# Patient Record
Sex: FEMALE | Race: WHITE | Employment: OTHER | URBAN - METROPOLITAN AREA
[De-identification: names, ages, dates, MRNs, and addresses within clinical notes are randomized per-mention and may not be internally consistent; named-entity substitution may affect disease eponyms.]

---

## 2018-08-03 ENCOUNTER — HOSPITAL ENCOUNTER (OUTPATIENT)
Age: 23
Discharge: HOME | End: 2018-08-05
Payer: COMMERCIAL

## 2018-08-03 ENCOUNTER — OFFICE VISIT (OUTPATIENT)
Dept: FAMILY MEDICINE CLINIC | Age: 23
End: 2018-08-03
Payer: COMMERCIAL

## 2018-08-03 VITALS
DIASTOLIC BLOOD PRESSURE: 70 MMHG | WEIGHT: 197 LBS | BODY MASS INDEX: 33.63 KG/M2 | OXYGEN SATURATION: 98 % | SYSTOLIC BLOOD PRESSURE: 110 MMHG | HEART RATE: 71 BPM | HEIGHT: 64 IN

## 2018-08-03 DIAGNOSIS — R53.83 FATIGUE, UNSPECIFIED TYPE: ICD-10-CM

## 2018-08-03 DIAGNOSIS — R53.83 FATIGUE, UNSPECIFIED TYPE: Primary | ICD-10-CM

## 2018-08-03 PROCEDURE — 1036F TOBACCO NON-USER: CPT | Performed by: FAMILY MEDICINE

## 2018-08-03 PROCEDURE — 80053 COMPREHEN METABOLIC PANEL: CPT

## 2018-08-03 PROCEDURE — 82306 VITAMIN D 25 HYDROXY: CPT

## 2018-08-03 PROCEDURE — G8427 DOCREV CUR MEDS BY ELIG CLIN: HCPCS | Performed by: FAMILY MEDICINE

## 2018-08-03 PROCEDURE — 84443 ASSAY THYROID STIM HORMONE: CPT

## 2018-08-03 PROCEDURE — G8417 CALC BMI ABV UP PARAM F/U: HCPCS | Performed by: FAMILY MEDICINE

## 2018-08-03 PROCEDURE — 82607 VITAMIN B-12: CPT

## 2018-08-03 PROCEDURE — 36415 COLL VENOUS BLD VENIPUNCTURE: CPT

## 2018-08-03 PROCEDURE — 83036 HEMOGLOBIN GLYCOSYLATED A1C: CPT

## 2018-08-03 PROCEDURE — 99202 OFFICE O/P NEW SF 15 MIN: CPT | Performed by: FAMILY MEDICINE

## 2018-08-03 PROCEDURE — 82746 ASSAY OF FOLIC ACID SERUM: CPT

## 2018-08-03 PROCEDURE — 83550 IRON BINDING TEST: CPT

## 2018-08-03 PROCEDURE — 83540 ASSAY OF IRON: CPT

## 2018-08-03 PROCEDURE — 84439 ASSAY OF FREE THYROXINE: CPT

## 2018-08-03 PROCEDURE — 85025 COMPLETE CBC W/AUTO DIFF WBC: CPT

## 2018-08-03 ASSESSMENT — PATIENT HEALTH QUESTIONNAIRE - PHQ9
SUM OF ALL RESPONSES TO PHQ QUESTIONS 1-9: 0
2. FEELING DOWN, DEPRESSED OR HOPELESS: 0
SUM OF ALL RESPONSES TO PHQ QUESTIONS 1-9: 0
1. LITTLE INTEREST OR PLEASURE IN DOING THINGS: 0
2. FEELING DOWN, DEPRESSED OR HOPELESS: 0
SUM OF ALL RESPONSES TO PHQ9 QUESTIONS 1 & 2: 0
1. LITTLE INTEREST OR PLEASURE IN DOING THINGS: 0
SUM OF ALL RESPONSES TO PHQ9 QUESTIONS 1 & 2: 0

## 2018-08-04 LAB
ALBUMIN SERPL-MCNC: 4.8 G/DL (ref 3.5–5.2)
ALP BLD-CCNC: 108 U/L (ref 35–104)
ALT SERPL-CCNC: 12 U/L (ref 0–32)
ANION GAP SERPL CALCULATED.3IONS-SCNC: 16 MMOL/L (ref 7–16)
AST SERPL-CCNC: 16 U/L (ref 0–31)
BASOPHILS ABSOLUTE: 0.03 E9/L (ref 0–0.2)
BASOPHILS RELATIVE PERCENT: 0.6 % (ref 0–2)
BILIRUB SERPL-MCNC: 0.4 MG/DL (ref 0–1.2)
BUN BLDV-MCNC: 13 MG/DL (ref 6–20)
CALCIUM SERPL-MCNC: 9.6 MG/DL (ref 8.6–10.2)
CHLORIDE BLD-SCNC: 101 MMOL/L (ref 98–107)
CO2: 25 MMOL/L (ref 22–29)
CREAT SERPL-MCNC: 0.8 MG/DL (ref 0.5–1)
EOSINOPHILS ABSOLUTE: 0.26 E9/L (ref 0.05–0.5)
EOSINOPHILS RELATIVE PERCENT: 5 % (ref 0–6)
FOLATE: 11.3 NG/ML (ref 4.8–24.2)
GFR AFRICAN AMERICAN: >60
GFR NON-AFRICAN AMERICAN: >60 ML/MIN/1.73
GLUCOSE BLD-MCNC: 101 MG/DL (ref 74–109)
HBA1C MFR BLD: 5.2 % (ref 4–5.6)
HCT VFR BLD CALC: 44.1 % (ref 34–48)
HEMOGLOBIN: 13.9 G/DL (ref 11.5–15.5)
IMMATURE GRANULOCYTES #: 0.01 E9/L
IMMATURE GRANULOCYTES %: 0.2 % (ref 0–5)
IRON SATURATION: 22 % (ref 15–50)
IRON: 92 MCG/DL (ref 37–145)
LYMPHOCYTES ABSOLUTE: 1.3 E9/L (ref 1.5–4)
LYMPHOCYTES RELATIVE PERCENT: 25.2 % (ref 20–42)
MCH RBC QN AUTO: 28.3 PG (ref 26–35)
MCHC RBC AUTO-ENTMCNC: 31.5 % (ref 32–34.5)
MCV RBC AUTO: 89.8 FL (ref 80–99.9)
MONOCYTES ABSOLUTE: 0.38 E9/L (ref 0.1–0.95)
MONOCYTES RELATIVE PERCENT: 7.4 % (ref 2–12)
NEUTROPHILS ABSOLUTE: 3.18 E9/L (ref 1.8–7.3)
NEUTROPHILS RELATIVE PERCENT: 61.6 % (ref 43–80)
PDW BLD-RTO: 13.5 FL (ref 11.5–15)
PLATELET # BLD: 178 E9/L (ref 130–450)
PMV BLD AUTO: 11.6 FL (ref 7–12)
POTASSIUM SERPL-SCNC: 3.9 MMOL/L (ref 3.5–5)
RBC # BLD: 4.91 E12/L (ref 3.5–5.5)
SODIUM BLD-SCNC: 142 MMOL/L (ref 132–146)
T4 FREE: 1.17 NG/DL (ref 0.93–1.7)
TOTAL IRON BINDING CAPACITY: 414 MCG/DL (ref 250–450)
TOTAL PROTEIN: 7.9 G/DL (ref 6.4–8.3)
TSH SERPL DL<=0.05 MIU/L-ACNC: 2.61 UIU/ML (ref 0.27–4.2)
VITAMIN B-12: 448 PG/ML (ref 211–946)
VITAMIN D 25-HYDROXY: 29 NG/ML (ref 30–100)
WBC # BLD: 5.2 E9/L (ref 4.5–11.5)

## 2018-08-05 ASSESSMENT — ENCOUNTER SYMPTOMS
COUGH: 0
PHOTOPHOBIA: 0
CONSTIPATION: 0
DIARRHEA: 0
SHORTNESS OF BREATH: 0
VOMITING: 0
WHEEZING: 0
RECTAL PAIN: 0
NAUSEA: 0
TROUBLE SWALLOWING: 0
CHOKING: 0
EYE DISCHARGE: 0
ABDOMINAL PAIN: 0
RHINORRHEA: 0
CHEST TIGHTNESS: 0
ANAL BLEEDING: 0
BACK PAIN: 0
COLOR CHANGE: 0
VOICE CHANGE: 0
ABDOMINAL DISTENTION: 0
SORE THROAT: 0
EYE REDNESS: 0
SINUS PRESSURE: 0
STRIDOR: 0
FACIAL SWELLING: 0
BLOOD IN STOOL: 0
EYE ITCHING: 0
APNEA: 0
EYE PAIN: 0

## 2018-08-05 NOTE — PROGRESS NOTES
Subjective:      Patient ID: Ketty Morejon is a 24 y.o. female. Fatigue   This is a new problem. The current episode started more than 1 month ago. The problem occurs daily. The problem has been waxing and waning. Associated symptoms include fatigue. Pertinent negatives include no abdominal pain, arthralgias, chest pain, chills, congestion, coughing, diaphoresis, fever, headaches, joint swelling, myalgias, nausea, neck pain, numbness, rash, sore throat, vomiting or weakness. Nothing aggravates the symptoms. She has tried nothing for the symptoms. The treatment provided no relief. Review of Systems   Constitutional: Positive for fatigue. Negative for activity change, appetite change, chills, diaphoresis, fever and unexpected weight change. HENT: Negative for congestion, dental problem, drooling, ear discharge, ear pain, facial swelling, hearing loss, mouth sores, nosebleeds, postnasal drip, rhinorrhea, sinus pressure, sneezing, sore throat, tinnitus, trouble swallowing and voice change. Eyes: Negative for photophobia, pain, discharge, redness, itching and visual disturbance. Respiratory: Negative for apnea, cough, choking, chest tightness, shortness of breath, wheezing and stridor. Cardiovascular: Negative for chest pain, palpitations and leg swelling. Gastrointestinal: Negative for abdominal distention, abdominal pain, anal bleeding, blood in stool, constipation, diarrhea, nausea, rectal pain and vomiting. Endocrine: Negative for cold intolerance, heat intolerance, polydipsia, polyphagia and polyuria. Genitourinary: Negative for decreased urine volume, difficulty urinating, dyspareunia, dysuria, enuresis, flank pain, frequency, genital sores, hematuria, menstrual problem, pelvic pain, urgency, vaginal bleeding, vaginal discharge and vaginal pain. Musculoskeletal: Negative for arthralgias, back pain, gait problem, joint swelling, myalgias, neck pain and neck stiffness.    Skin:

## 2019-11-21 ENCOUNTER — INITIAL PRENATAL (OUTPATIENT)
Dept: OBGYN CLINIC | Age: 24
End: 2019-11-21
Payer: COMMERCIAL

## 2019-11-21 VITALS
BODY MASS INDEX: 37.04 KG/M2 | WEIGHT: 215.8 LBS | SYSTOLIC BLOOD PRESSURE: 112 MMHG | DIASTOLIC BLOOD PRESSURE: 64 MMHG | HEART RATE: 81 BPM

## 2019-11-21 DIAGNOSIS — Z82.79 FAMILY HISTORY OF VSD (VENTRICULAR SEPTAL DEFECT): ICD-10-CM

## 2019-11-21 DIAGNOSIS — Z36.89 ENCOUNTER FOR FETAL ANATOMIC SURVEY: ICD-10-CM

## 2019-11-21 DIAGNOSIS — Z3A.21 21 WEEKS GESTATION OF PREGNANCY: Primary | ICD-10-CM

## 2019-11-21 LAB
GLUCOSE URINE, POC: NEGATIVE
PROTEIN UA: NEGATIVE

## 2019-11-21 PROCEDURE — 99202 OFFICE O/P NEW SF 15 MIN: CPT | Performed by: OBSTETRICS & GYNECOLOGY

## 2019-11-21 PROCEDURE — 81002 URINALYSIS NONAUTO W/O SCOPE: CPT | Performed by: OBSTETRICS & GYNECOLOGY

## 2019-11-21 PROCEDURE — G8419 CALC BMI OUT NRM PARAM NOF/U: HCPCS | Performed by: OBSTETRICS & GYNECOLOGY

## 2019-11-21 PROCEDURE — 76811 OB US DETAILED SNGL FETUS: CPT | Performed by: OBSTETRICS & GYNECOLOGY

## 2019-11-21 PROCEDURE — G8484 FLU IMMUNIZE NO ADMIN: HCPCS | Performed by: OBSTETRICS & GYNECOLOGY

## 2019-11-21 PROCEDURE — G8427 DOCREV CUR MEDS BY ELIG CLIN: HCPCS | Performed by: OBSTETRICS & GYNECOLOGY

## 2019-11-21 PROCEDURE — 1036F TOBACCO NON-USER: CPT | Performed by: OBSTETRICS & GYNECOLOGY

## 2020-01-16 ENCOUNTER — ROUTINE PRENATAL (OUTPATIENT)
Dept: OBGYN CLINIC | Age: 25
End: 2020-01-16
Payer: COMMERCIAL

## 2020-01-16 VITALS
BODY MASS INDEX: 39.82 KG/M2 | WEIGHT: 232 LBS | DIASTOLIC BLOOD PRESSURE: 69 MMHG | HEART RATE: 84 BPM | SYSTOLIC BLOOD PRESSURE: 102 MMHG

## 2020-01-16 PROBLEM — Z3A.29 29 WEEKS GESTATION OF PREGNANCY: Status: ACTIVE | Noted: 2020-01-16

## 2020-01-16 LAB
GLUCOSE URINE, POC: NEGATIVE
PROTEIN UA: POSITIVE

## 2020-01-16 PROCEDURE — 81002 URINALYSIS NONAUTO W/O SCOPE: CPT | Performed by: OBSTETRICS & GYNECOLOGY

## 2020-01-16 PROCEDURE — 99201 PR OFFICE OUTPATIENT NEW 10 MINUTES: CPT | Performed by: OBSTETRICS & GYNECOLOGY

## 2020-01-16 PROCEDURE — 76816 OB US FOLLOW-UP PER FETUS: CPT | Performed by: OBSTETRICS & GYNECOLOGY

## 2020-01-16 PROCEDURE — 76819 FETAL BIOPHYS PROFIL W/O NST: CPT | Performed by: OBSTETRICS & GYNECOLOGY

## 2020-01-16 PROCEDURE — 99211 OFF/OP EST MAY X REQ PHY/QHP: CPT | Performed by: OBSTETRICS & GYNECOLOGY

## 2020-01-16 NOTE — LETTER
[facility]  [id number] [address] [address]  Phone: [phone]  Fax: [phone]    Anahy Tavera MD        2020       Patient: Johnna Arambula   MR Number: [id number]   YOB: 1995   Date of Visit: 2020       Dear Dr. Hodge Erp: Thank you for the request for consultation for Althea Layman. Below is my progress note for today's consultation. MATERNAL-FETAL MEDICINE CONSULT    Referring/Requesting Provider: Berny Posadas      CHIEF COMPLAINT: Premature atrial contractions    HISTORY OF PRESENT ILLNESS:   Geronimo Quiroz is a 21 y.o. female  at 32w2d who presents for ultrasound and consultation  regarding premature atrial contractions that were identified on her growth scan today. She denies obstetrical complaints and is overall feeling well. She denies smoking, caffeine use. She does use cocoa/shea butter on a regular basis.           OB HISTORY:  OB History    Para Term  AB Living   2 1 1     1   SAB TAB Ectopic Molar Multiple Live Births             1      # Outcome Date GA Lbr Danie/2nd Weight Sex Delivery Anes PTL Lv   2 Current            1 Term 17 40w0d  7 lb 1 oz (3.204 kg) M Vag-Spont  N TOMA       PAST MEDICAL HISTORY:  Past Medical History:   Diagnosis Date    Complication of anesthesia     hx of waking during anesthesia       PAST SURGICAL HISTORY:  Past Surgical History:   Procedure Laterality Date    SALIVARY GLAND SURGERY  2011    left submandibular abscess       PERTINENT FAMILY HISTORY:  Family History   Problem Relation Age of Onset    Thyroid Disease Mother     No Known Problems Father     Stroke Maternal Grandfather     Diabetes Maternal Grandfather        MEDS:   Current Outpatient Rx   Medication Sig Dispense Refill    Prenatal MV-Min-Fe Fum-FA-DHA (PRENATAL 1 PO) Take by mouth         ALLERGY:   No Known Allergies    REVIEW OF SYSTEMS:

## 2020-01-16 NOTE — PATIENT INSTRUCTIONS
the rib cage and above the waist on either side of the back. ? Blood in your urine. Watch closely for changes in your health, and be sure to contact your doctor if:  · You have vaginal discharge that smells bad. · You have skin changes, such as:  ? A rash. ? Itching. ? Yellow color to your skin. · You have other concerns about your pregnancy. If you have labor signs at 37 weeks or more  If you have signs of labor at 37 weeks or more, your doctor may tell you to call when your labor becomes more active. Symptoms of active labor include:  · Contractions that are regular. · Contractions that are less than 5 minutes apart. · Contractions that are hard to talk through. Follow-up care is a key part of your treatment and safety. Be sure to make and go to all appointments, and call your doctor if you are having problems. It's also a good idea to know your test results and keep a list of the medicines you take. Where can you learn more? Go to https://Weroompechaceeb.SparCode. org and sign in to your S[employer] My Timeshare NOW account. Enter  in the Notizza box to learn more about \"Learning About When to Call Your Doctor During Pregnancy (After 20 Weeks). \"     If you do not have an account, please click on the \"Sign Up Now\" link. Current as of: May 29, 2019  Content Version: 12.3  © 0947-8872 Healthwise, Incorporated. Care instructions adapted under license by [facility] ([facility]). If you have questions about a medical condition or this instruction, always ask your healthcare professional. [place] [address] any warranty or liability for your use of this information. Patient Education        Counting Your Baby's Kicks: Care Instructions  Your Care Instructions    Counting your baby's kicks is one way your doctor can tell that your baby is healthy. Most women--especially in a first pregnancy--feel their baby move for the first time between 16 and 22 weeks.  The movement may feel like flutters rather than kicks. Your baby may move more at certain times of the day. When you are active, you may notice less kicking than when you are resting. At your prenatal visits, your doctor will ask whether the baby is active. In your last trimester, your doctor may ask you to count the number of times you feel your baby move. Follow-up care is a key part of your treatment and safety. Be sure to make and go to all appointments, and call your doctor if you are having problems. It's also a good idea to know your test results and keep a list of the medicines you take. How do you count fetal kicks? · A common method of checking your baby's movement is to count the number of kicks or moves you feel in 1 hour. Ten movements (such as kicks, flutters, or rolls) in 1 hour are normal. Some doctors suggest that you count in the morning until you get to 10 movements. Then you can quit for that day and start again the next day. · Pick your baby's most active time of day to count. This may be any time from morning to evening. · If you do not feel 10 movements in an hour, your baby may be sleeping. Wait for the next hour and count again. When should you call for help? Call your doctor now or seek immediate medical care if:    · You noticed that your baby has stopped moving or is moving much less than normal.    Watch closely for changes in your health, and be sure to contact your doctor if you have any problems. Where can you learn more? Go to https://Vir2uscherylSilith.IO.Consensus Point. org and sign in to your Rattle account. Enter S240 in the Pint Please box to learn more about \"Counting Your Baby's Kicks: Care Instructions. \"     If you do not have an account, please click on the \"Sign Up Now\" link. Current as of: May 29, 2019  Content Version: 12.3  © 1190-3117 Healthwise, Incorporated. Care instructions adapted under license by [facility] ([facility]).  If you have questions about a medical condition or this instruction, always ask your healthcare professional. [place] [address] any warranty or liability for your use of this information.

## 2020-01-16 NOTE — PROGRESS NOTES
DOS: 20     [place] MATERNAL FETAL MEDICINE      MATERNAL-FETAL MEDICINE CONSULT    Referring/Requesting Provider: Aristides Simons      CHIEF COMPLAINT: Premature atrial contractions    HISTORY OF PRESENT ILLNESS:   Geronimo Quiroz is a 21 y.o. female  at 32w2d who presents for ultrasound and consultation  regarding premature atrial contractions that were identified on her growth scan today. She denies obstetrical complaints and is overall feeling well. She denies smoking, caffeine use. She does use cocoa/shea butter on a regular basis. OB HISTORY:  OB History    Para Term  AB Living   2 1 1     1   SAB TAB Ectopic Molar Multiple Live Births             1      # Outcome Date GA Lbr Danie/2nd Weight Sex Delivery Anes PTL Lv   2 Current            1 Term 17 40w0d  7 lb 1 oz (3.204 kg) M Vag-Spont  N TOMA       PAST MEDICAL HISTORY:  Past Medical History:   Diagnosis Date    Complication of anesthesia     hx of waking during anesthesia       PAST SURGICAL HISTORY:  Past Surgical History:   Procedure Laterality Date    SALIVARY GLAND SURGERY  2011    left submandibular abscess       PERTINENT FAMILY HISTORY:  Family History   Problem Relation Age of Onset    Thyroid Disease Mother     No Known Problems Father     Stroke Maternal Grandfather     Diabetes Maternal Grandfather        MEDS:   Current Outpatient Rx   Medication Sig Dispense Refill    Prenatal MV-Min-Fe Fum-FA-DHA (PRENATAL 1 PO) Take by mouth         ALLERGY:   No Known Allergies    REVIEW OF SYSTEMS:     A comprehensive review of systems was negative. PHYSICAL EXAM:  VITAL SIGNS: /69   Pulse 84   Wt 232 lb (105.2 kg)   BMI 39.82 kg/m²   General Appearance: Alert, appropriate appearance for age. No acute distress    IMAGING:  Please see ultrasound report for full details.      LABS:    Initial Prenatal on 2019   Component Date Value Ref Range Status    Protein, UA 2019 Negative Negative Final    Glucose, UA POC 2019 negative   Final         IMPRESSION:   Arelis Cortez is a 21 y.o. female  with premature atrial contractions found on ultrasound today. We focused on the ultrasound findings today and did not discuss other pregnancy related issues. She was counseled that premature atrial contractions are typically benign but in rare cases can progress to a sustained tachyarrhythmia. RECOMMENDATIONS:  Fetal echocardiogram with Pediatric Cardiology has been scheduled. Consider referral to the [address] [place] [address] for  coordination of care. Follow up q4 weeks to evaluate biometric parameters and evaluation of fetal heart rhythm. Please listen to fetal heart rate for approximately 1 minute at each prenatal visit. Sustained tachycardia or bradycardia should be re-referred to [facility]. The total patient time of the visit was 15 minutes, of which was greater than 50% of the time was spent counseling and coordinating care.

## 2020-01-21 ENCOUNTER — TELEPHONE (OUTPATIENT)
Dept: OBGYN CLINIC | Age: 25
End: 2020-01-21

## 2020-02-16 ENCOUNTER — HOSPITAL ENCOUNTER (EMERGENCY)
Age: 25
Discharge: HOME | End: 2020-02-16
Attending: EMERGENCY MEDICINE
Payer: COMMERCIAL

## 2020-02-16 VITALS
SYSTOLIC BLOOD PRESSURE: 135 MMHG | BODY MASS INDEX: 39.49 KG/M2 | OXYGEN SATURATION: 98 % | TEMPERATURE: 97.6 F | HEIGHT: 65 IN | WEIGHT: 237 LBS | DIASTOLIC BLOOD PRESSURE: 76 MMHG | RESPIRATION RATE: 20 BRPM | HEART RATE: 101 BPM

## 2020-02-16 LAB
BACTERIA: ABNORMAL /HPF
BILIRUBIN URINE: NEGATIVE
BLOOD, URINE: NEGATIVE
CLARITY: CLEAR
COLOR: YELLOW
EPITHELIAL CELLS, UA: ABNORMAL /HPF
GLUCOSE URINE: NEGATIVE MG/DL
KETONES, URINE: NEGATIVE MG/DL
LEUKOCYTE ESTERASE, URINE: ABNORMAL
NITRITE, URINE: NEGATIVE
PH UA: 7 (ref 5–9)
PROTEIN UA: NEGATIVE MG/DL
RBC UA: ABNORMAL /HPF (ref 0–2)
SPECIFIC GRAVITY UA: 1.02 (ref 1–1.03)
UROBILINOGEN, URINE: 1 E.U./DL
WBC UA: ABNORMAL /HPF (ref 0–5)

## 2020-02-16 PROCEDURE — 87088 URINE BACTERIA CULTURE: CPT

## 2020-02-16 PROCEDURE — 99282 EMERGENCY DEPT VISIT SF MDM: CPT

## 2020-02-16 PROCEDURE — 81001 URINALYSIS AUTO W/SCOPE: CPT

## 2020-02-16 RX ORDER — CEPHALEXIN 500 MG/1
500 CAPSULE ORAL 2 TIMES DAILY
Qty: 6 CAPSULE | Refills: 0 | Status: SHIPPED | OUTPATIENT
Start: 2020-02-16 | End: 2020-02-19

## 2020-02-16 ASSESSMENT — PAIN DESCRIPTION - LOCATION: LOCATION: COCCYX

## 2020-02-16 ASSESSMENT — ENCOUNTER SYMPTOMS
COUGH: 0
ABDOMINAL PAIN: 0
DIARRHEA: 0
SHORTNESS OF BREATH: 0
SORE THROAT: 0
VOMITING: 0
NAUSEA: 0
WHEEZING: 0

## 2020-02-16 ASSESSMENT — PAIN SCALES - GENERAL: PAINLEVEL_OUTOF10: 10

## 2020-02-16 NOTE — ED NOTES
Bed: 21  Expected date:   Expected time:   Means of arrival:   Comments:     Dahiana Pisano RN  02/16/20 3426

## 2020-02-16 NOTE — ED PROVIDER NOTES
The patient is a 24-year-old female presenting with chief complaint of cyst on her tailbone. She has no significant past medical history. Patient states for the past 2 days she has had a 10 out of 10 pain that has increased on her tailbone. She states she has had cyst before that went away with warm compress and warm baths. She currently is 8 months pregnant and states she feels the baby moving. She states her pain is made worse when she sits down. She denies any alleviating factors. Review of Systems   Constitutional: Negative for chills and fever. HENT: Negative for congestion and sore throat. Eyes: Negative for visual disturbance. Respiratory: Negative for cough, shortness of breath and wheezing. Cardiovascular: Negative for chest pain and palpitations. Gastrointestinal: Negative for abdominal pain, diarrhea, nausea and vomiting. Genitourinary: Negative for dysuria, frequency and hematuria. Skin: Negative. Pain on the tailbone   Neurological: Negative for dizziness, weakness, light-headedness and headaches. Hematological: Negative. Psychiatric/Behavioral: Negative. Physical Exam  Vitals signs and nursing note reviewed. Constitutional:       Appearance: She is well-developed. HENT:      Head: Normocephalic and atraumatic. Nose: Nose normal.      Mouth/Throat:      Pharynx: Oropharynx is clear. Eyes:      Conjunctiva/sclera: Conjunctivae normal.      Pupils: Pupils are equal, round, and reactive to light. Neck:      Musculoskeletal: Normal range of motion. Vascular: No JVD. Cardiovascular:      Rate and Rhythm: Regular rhythm. Tachycardia present. Pulses: Normal pulses. Heart sounds: Normal heart sounds. Pulmonary:      Effort: Pulmonary effort is normal. No respiratory distress. Breath sounds: Normal breath sounds. No wheezing, rhonchi or rales. Abdominal:      General: Bowel sounds are normal. There is distension. Palpations: Abdomen is soft. There is no mass. Tenderness: There is no abdominal tenderness. There is no guarding or rebound. Comments: Gravid abdomen   Musculoskeletal:         General: No swelling or deformity. Skin:         Neurological:      Mental Status: She is alert. Procedures   MUSCULOSKELETAL BEDSIDE ULTRASOUND     Risks, benefits and alternatives (for applicable procedures below) described. Performed By: Neda Mcmahon DO. Indication:  Pain, deformity. Informed consent: The patient provided verbal consent for this procedure. .  Procedural Quadrants:     REGION: Sacral region with pocketing of fluid present. Tender to palpation. Incision and Drainage Procedure Note    Indication: Abscess    Procedure: The patient was positioned appropriately and the skin over the incision site was prepped with Chloraprep. Local anesthesia was obtained by infiltration using 1% Lidocaine without epinephrine. An incision was then made over the apex of the lesion and approximately 2 cc of serosanguineous material was expressed. Loculations were broken up using forceps but no more material was returned. The drainage cavity was then irrigated and dressed with a sterile dressing. The patients tetanus status was up to date and did not require a booster dose. The patient tolerated the procedure well. Complications: None          MDM     ED Course as of Feb 17 0138   Sun Feb 16, 2020   1632 Patient presents to the ED for evaluation. Work-up was performed with concerns for but not limited to pilonidal cyst, cellulitis, abscess. After bedside ultrasound was performed there was a pocketing of fluid around the tailbone of the patient. Using lidocaine without epi the area was anesthetized. Small incisions were made with 11 blade. Serosanguineous and bloody drainage were present. The area was covered with sterile dressing.   She is advised to have close follow-up due to the area being

## 2020-02-17 ENCOUNTER — TELEPHONE (OUTPATIENT)
Dept: FAMILY MEDICINE CLINIC | Age: 25
End: 2020-02-17

## 2020-02-18 ENCOUNTER — HOSPITAL ENCOUNTER (EMERGENCY)
Age: 25
Discharge: HOME | End: 2020-02-18
Attending: EMERGENCY MEDICINE
Payer: COMMERCIAL

## 2020-02-18 ENCOUNTER — ROUTINE PRENATAL (OUTPATIENT)
Dept: OBGYN CLINIC | Age: 25
End: 2020-02-18
Payer: COMMERCIAL

## 2020-02-18 VITALS
RESPIRATION RATE: 22 BRPM | WEIGHT: 243 LBS | TEMPERATURE: 98.2 F | HEART RATE: 105 BPM | OXYGEN SATURATION: 95 % | SYSTOLIC BLOOD PRESSURE: 146 MMHG | HEIGHT: 65 IN | DIASTOLIC BLOOD PRESSURE: 77 MMHG | BODY MASS INDEX: 40.48 KG/M2

## 2020-02-18 VITALS — BODY MASS INDEX: 40.13 KG/M2 | WEIGHT: 241.13 LBS

## 2020-02-18 PROBLEM — Z3A.34 34 WEEKS GESTATION OF PREGNANCY: Status: ACTIVE | Noted: 2020-02-18

## 2020-02-18 LAB — URINE CULTURE, ROUTINE: NORMAL

## 2020-02-18 PROCEDURE — 99999 PR OFFICE/OUTPT VISIT,PROCEDURE ONLY: CPT | Performed by: OBSTETRICS & GYNECOLOGY

## 2020-02-18 PROCEDURE — 99284 EMERGENCY DEPT VISIT MOD MDM: CPT

## 2020-02-18 PROCEDURE — 87205 SMEAR GRAM STAIN: CPT

## 2020-02-18 PROCEDURE — 87070 CULTURE OTHR SPECIMN AEROBIC: CPT

## 2020-02-18 PROCEDURE — 10080 I&D PILONIDAL CYST SIMPLE: CPT

## 2020-02-18 PROCEDURE — 99213 OFFICE O/P EST LOW 20 MIN: CPT | Performed by: OBSTETRICS & GYNECOLOGY

## 2020-02-18 PROCEDURE — 76816 OB US FOLLOW-UP PER FETUS: CPT | Performed by: OBSTETRICS & GYNECOLOGY

## 2020-02-18 RX ORDER — CLINDAMYCIN HYDROCHLORIDE 300 MG/1
450 CAPSULE ORAL 3 TIMES DAILY
Qty: 42 CAPSULE | Refills: 0 | Status: SHIPPED | OUTPATIENT
Start: 2020-02-18 | End: 2020-02-18

## 2020-02-18 RX ORDER — CLINDAMYCIN HYDROCHLORIDE 150 MG/1
450 CAPSULE ORAL 3 TIMES DAILY
Qty: 63 CAPSULE | Refills: 0 | Status: SHIPPED | OUTPATIENT
Start: 2020-02-18 | End: 2020-02-25

## 2020-02-18 ASSESSMENT — ENCOUNTER SYMPTOMS
NAUSEA: 0
WHEEZING: 0
CONSTIPATION: 0
CHEST TIGHTNESS: 0
ABDOMINAL PAIN: 0
SHORTNESS OF BREATH: 0
VOMITING: 0
RHINORRHEA: 0
SORE THROAT: 0
BLOOD IN STOOL: 0
DIARRHEA: 0
COUGH: 0
BACK PAIN: 0

## 2020-02-18 ASSESSMENT — PAIN SCALES - GENERAL: PAINLEVEL_OUTOF10: 10

## 2020-02-18 NOTE — PATIENT INSTRUCTIONS
the pain. Others make your lower body numb so that you won't feel pain. · Be sure to tell your doctor about your pain medicine choice before you start labor or very early in your labor. You may be able to change your mind as labor progresses. · Rarely, a woman is put to sleep by medicine given through a mask or an IV. Labor and delivery  · The first stage of labor has three parts: early, active, and transition. ? Most women have early labor at home. You can stay busy or rest, eat light snacks, drink clear fluids, and start counting contractions. ? When talking during a contraction gets hard, you may be moving to active labor. During active labor, you should head for the hospital if you are not there already. ? You are in active labor when contractions come every 3 to 4 minutes and last about 60 seconds. Your cervix is opening more rapidly. ? If your water breaks, contractions will come faster and stronger. ? During transition, your cervix is stretching, and contractions are coming more rapidly. ? You may want to push, but your cervix might not be ready. Your doctor will tell you when to push. · The second stage starts when your cervix is completely opened and you are ready to push. ? Contractions are very strong to push the baby down the birth canal.  ? You will feel the urge to push. You may feel like you need to have a bowel movement. ? You may be coached to push with contractions. These contractions will be very strong, but you will not have them as often. You can get a little rest between contractions. ? You may be emotional and irritable. You may not be aware of what is going on around you.  ? One last push, and your baby is born. · The third stage is when a few more contractions push out the placenta. This may take 30 minutes or less. · The fourth stage is the welcome recovery. You may feel overwhelmed with emotions and exhausted but alert. This is a good time to start breastfeeding.   Where can liability for your use of this information. Patient Education        Counting Your Baby's Kicks: Care Instructions  Your Care Instructions    Counting your baby's kicks is one way your doctor can tell that your baby is healthy. Most women--especially in a first pregnancy--feel their baby move for the first time between 16 and 22 weeks. The movement may feel like flutters rather than kicks. Your baby may move more at certain times of the day. When you are active, you may notice less kicking than when you are resting. At your prenatal visits, your doctor will ask whether the baby is active. In your last trimester, your doctor may ask you to count the number of times you feel your baby move. Follow-up care is a key part of your treatment and safety. Be sure to make and go to all appointments, and call your doctor if you are having problems. It's also a good idea to know your test results and keep a list of the medicines you take. How do you count fetal kicks? · A common method of checking your baby's movement is to count the number of kicks or moves you feel in 1 hour. Ten movements (such as kicks, flutters, or rolls) in 1 hour are normal. Some doctors suggest that you count in the morning until you get to 10 movements. Then you can quit for that day and start again the next day. · Pick your baby's most active time of day to count. This may be any time from morning to evening. · If you do not feel 10 movements in an hour, your baby may be sleeping. Wait for the next hour and count again. When should you call for help? Call your doctor now or seek immediate medical care if:    · You noticed that your baby has stopped moving or is moving much less than normal.    Watch closely for changes in your health, and be sure to contact your doctor if you have any problems. Where can you learn more? Go to https://justin.ODK Media. org and sign in to your Maeglin Software account.  Enter B401 in the [address] Information box to learn more about \"Counting Your Baby's Kicks: Care Instructions. \"     If you do not have an account, please click on the \"Sign Up Now\" link. Current as of: May 29, 2019  Content Version: 12.3  © 5169-8051 Healthwise, Incorporated. Care instructions adapted under license by [facility] ([facility]). If you have questions about a medical condition or this instruction, always ask your healthcare professional. [address] any warranty or liability for your use of this information.

## 2020-02-19 NOTE — ED PROVIDER NOTES
Patient is 48-year-old female who presents the emergency department for complaint of abscess. Patient is 8 months pregnant. States that she was here 2 to 3 days ago for the same complaint. At that time she had an abscess drained from her tailbone. This is in the position where pilonidal abscess would be located. States that she continues to have severe pain and swelling in the area. Denies any fevers, nausea/vomiting, chest pain, shortness of breath, abdominal pain, changes in urination or defecation. States that she has not had an abscess in this location before. She was placed on Keflex following her I&D previously. Physical exam is significant for noting tender abscess over gluteal cleft. Warm to touch. Review of Systems   Constitutional: Negative for appetite change, diaphoresis and fever. HENT: Negative for congestion, rhinorrhea and sore throat. Eyes: Negative for visual disturbance. Respiratory: Negative for cough, chest tightness, shortness of breath and wheezing. Cardiovascular: Negative for chest pain, palpitations and leg swelling. Gastrointestinal: Negative for abdominal pain, blood in stool, constipation, diarrhea, nausea and vomiting. Endocrine: Negative for polyuria. Genitourinary: Negative for decreased urine volume, dysuria and vaginal discharge. Musculoskeletal: Negative for back pain, neck pain and neck stiffness. Skin: Negative for rash. Gluteal abscess. Neurological: Negative for syncope, weakness, light-headedness and headaches. Hematological: Negative for adenopathy. Psychiatric/Behavioral: Negative for confusion. Physical Exam  Vitals signs and nursing note reviewed. Constitutional:       General: She is not in acute distress. Appearance: Normal appearance. She is not ill-appearing, toxic-appearing or diaphoretic. HENT:      Head: Normocephalic and atraumatic.       Right Ear: External ear normal.      Left Ear: External ear normal.      Nose: Nose normal. No congestion or rhinorrhea. Mouth/Throat:      Mouth: Mucous membranes are moist.      Pharynx: No oropharyngeal exudate or posterior oropharyngeal erythema. Eyes:      General: No scleral icterus. Right eye: No discharge. Left eye: No discharge. Extraocular Movements: Extraocular movements intact. Pupils: Pupils are equal, round, and reactive to light. Neck:      Musculoskeletal: Normal range of motion and neck supple. No neck rigidity or muscular tenderness. Vascular: No carotid bruit. Cardiovascular:      Rate and Rhythm: Normal rate and regular rhythm. Pulses: Normal pulses. Heart sounds: Normal heart sounds. No murmur. No friction rub. No gallop. Pulmonary:      Effort: Pulmonary effort is normal. No respiratory distress. Breath sounds: Normal breath sounds. No stridor. No wheezing, rhonchi or rales. Chest:      Chest wall: No tenderness. Abdominal:      General: Abdomen is flat. There is no distension. Palpations: Abdomen is soft. There is no mass. Tenderness: There is no abdominal tenderness. There is no right CVA tenderness, left CVA tenderness, guarding or rebound. Hernia: No hernia is present. Comments: Gravid abdomen   Musculoskeletal: Normal range of motion. General: No swelling, tenderness, deformity or signs of injury. Right lower leg: No edema. Left lower leg: No edema. Lymphadenopathy:      Cervical: No cervical adenopathy. Skin:     General: Skin is warm and dry. Capillary Refill: Capillary refill takes less than 2 seconds. Coloration: Skin is not jaundiced or pale. Findings: No bruising, erythema, lesion or rash. Neurological:      General: No focal deficit present. Mental Status: She is alert and oriented to person, place, and time. Cranial Nerves: No cranial nerve deficit. Sensory: No sensory deficit.    Psychiatric: Mood and Affect: Mood normal.         Behavior: Behavior normal.          Procedures     MDM             Incision and Drainage Procedure Note    Indication: Abscess    Procedure: The patient was positioned appropriately and the skin over the incision site was prepped with betadine and draped in a sterile fashion. Local anesthesia was obtained by infiltration using 1% Lidocaine without epinephrine. An incision was then made over the apex of the lesion and approximately 25 cc of thick, foul smelling and purulent material was expressed. Loculations were broken up using forceps and more of the material was able to be expressed. The drainage cavity was then irrigated, packed with sterile gauze and dressed with a sterile dressing. The patients tetanus status was up to date and did not require a booster dose. The patient tolerated the procedure well. Complications: None         --------------------------------------------- PAST HISTORY ---------------------------------------------  Past Medical History:  has a past medical history of Complication of anesthesia. Past Surgical History:  has a past surgical history that includes Salivary gland surgery (12/2011 5/2012). Social History:  reports that she has quit smoking. Her smoking use included cigarettes. She smoked 0.50 packs per day. She has never used smokeless tobacco. She reports previous alcohol use. She reports that she does not use drugs. Family History: family history includes Diabetes in her maternal grandfather; No Known Problems in her father; Stroke in her maternal grandfather; Thyroid Disease in her mother. The patients home medications have been reviewed. Allergies: Patient has no known allergies. -------------------------------------------------- RESULTS -------------------------------------------------  Labs:  No results found for this visit on 02/18/20.     Radiology:  No orders to display       ------------------------- NURSING NOTES AND VITALS REVIEWED ---------------------------  Date / Time Roomed:  2/18/2020  4:38 PM  ED Bed Assignment:  GARRET/GARRET    The nursing notes within the ED encounter and vital signs as below have been reviewed. BP (!) 146/77   Pulse 105   Temp 98.2 °F (36.8 °C) (Oral)   Resp 22   Ht 5' 5\" (1.651 m)   Wt 243 lb (110.2 kg)   SpO2 95%   BMI 40.44 kg/m²   Oxygen Saturation Interpretation: Normal      ------------------------------------------ PROGRESS NOTES ------------------------------------------  I have spoken with the patient and discussed todays results, in addition to providing specific details for the plan of care and counseling regarding the diagnosis and prognosis. Their questions are answered at this time and they are agreeable with the plan. I discussed at length with them reasons for immediate return here for re evaluation. They will followup with primary care by calling their office tomorrow. --------------------------------- ADDITIONAL PROVIDER NOTES ---------------------------------  At this time the patient is without objective evidence of an acute process requiring hospitalization or inpatient management. They have remained hemodynamically stable throughout their entire ED visit and are stable for discharge with outpatient follow-up. Patient had I&D of abscess in the emergency department with good effect. She was started on clindamycin. Discussed this with pharmacist regarding clindamycin use in pregnancy. Instructed her to follow-up with her primary care physician for reassessment in 1 to 2 days to have packing changed. Patient was also instructed to use Tylenol for any pain management. She was instructed not to use Motrin or ibuprofen. She was told to return the emergency department symptoms worsen. Patient agreed with this plan was discharged home.     The plan has been discussed in detail and they are aware of the specific conditions for emergent return, as

## 2020-02-20 ENCOUNTER — OFFICE VISIT (OUTPATIENT)
Dept: FAMILY MEDICINE CLINIC | Age: 25
End: 2020-02-20
Payer: COMMERCIAL

## 2020-02-20 VITALS
RESPIRATION RATE: 18 BRPM | HEIGHT: 65 IN | OXYGEN SATURATION: 98 % | SYSTOLIC BLOOD PRESSURE: 124 MMHG | HEART RATE: 87 BPM | BODY MASS INDEX: 40.82 KG/M2 | WEIGHT: 245 LBS | DIASTOLIC BLOOD PRESSURE: 76 MMHG

## 2020-02-20 PROCEDURE — 1036F TOBACCO NON-USER: CPT | Performed by: FAMILY MEDICINE

## 2020-02-20 PROCEDURE — 99213 OFFICE O/P EST LOW 20 MIN: CPT | Performed by: FAMILY MEDICINE

## 2020-02-20 PROCEDURE — G8419 CALC BMI OUT NRM PARAM NOF/U: HCPCS | Performed by: FAMILY MEDICINE

## 2020-02-20 PROCEDURE — G8427 DOCREV CUR MEDS BY ELIG CLIN: HCPCS | Performed by: FAMILY MEDICINE

## 2020-02-20 PROCEDURE — G8484 FLU IMMUNIZE NO ADMIN: HCPCS | Performed by: FAMILY MEDICINE

## 2020-02-20 ASSESSMENT — PATIENT HEALTH QUESTIONNAIRE - PHQ9
SUM OF ALL RESPONSES TO PHQ9 QUESTIONS 1 & 2: 0
SUM OF ALL RESPONSES TO PHQ QUESTIONS 1-9: 0
SUM OF ALL RESPONSES TO PHQ QUESTIONS 1-9: 0
1. LITTLE INTEREST OR PLEASURE IN DOING THINGS: 0
2. FEELING DOWN, DEPRESSED OR HOPELESS: 0

## 2020-02-21 ENCOUNTER — OFFICE VISIT (OUTPATIENT)
Dept: SURGERY | Age: 25
End: 2020-02-21
Payer: COMMERCIAL

## 2020-02-21 VITALS
HEART RATE: 89 BPM | BODY MASS INDEX: 39.99 KG/M2 | WEIGHT: 240 LBS | TEMPERATURE: 98.2 F | SYSTOLIC BLOOD PRESSURE: 116 MMHG | HEIGHT: 65 IN | DIASTOLIC BLOOD PRESSURE: 73 MMHG

## 2020-02-21 PROBLEM — L05.91 PILONIDAL CYST: Status: ACTIVE | Noted: 2020-02-21

## 2020-02-21 LAB
GRAM STAIN RESULT: ABNORMAL
ORGANISM: ABNORMAL
ORGANISM: ABNORMAL
WOUND/ABSCESS: ABNORMAL
WOUND/ABSCESS: ABNORMAL

## 2020-02-21 PROCEDURE — G8419 CALC BMI OUT NRM PARAM NOF/U: HCPCS | Performed by: SURGERY

## 2020-02-21 PROCEDURE — 99244 OFF/OP CNSLTJ NEW/EST MOD 40: CPT | Performed by: SURGERY

## 2020-02-21 PROCEDURE — G8484 FLU IMMUNIZE NO ADMIN: HCPCS | Performed by: SURGERY

## 2020-02-21 PROCEDURE — G8427 DOCREV CUR MEDS BY ELIG CLIN: HCPCS | Performed by: SURGERY

## 2020-02-21 RX ORDER — DIPHENHYDRAMINE HCL 25 MG
25 TABLET ORAL NIGHTLY PRN
Status: ON HOLD | COMMUNITY
End: 2022-01-26 | Stop reason: HOSPADM

## 2020-02-21 NOTE — PROGRESS NOTES
Elvira Lewis  2/21/2020      Office Consult    CHIEF COMPLAINT:  Pilonidal cyst    HISTORY OF PRESENT ILLNESS:  Elvira Lewis is a 21 y.o.  female 8 months pregnant with pilonidal cyst drained in the ER and packed 2 days ago, started on Clindamycin tid for 10 days. Wound is 4 mm, draining purulent fluid. No surround cellulitis. Past Medical History: She has a past medical history of Complication of anesthesia. Past Surgical History: She has a past surgical history that includes Salivary gland surgery (12/2011 5/2012). Home Medications  Prior to Visit Medications    Medication Sig Taking? Authorizing Provider   diphenhydrAMINE (BENADRYL) 25 MG tablet Take 25 mg by mouth nightly as needed for Itching Yes Historical Provider, MD   clindamycin (CLEOCIN) 150 MG capsule Take 3 capsules by mouth 3 times daily for 7 days Yes Iris Car, DO   Prenatal MV-Min-Fe Fum-FA-DHA (PRENATAL 1 PO) Take by mouth Yes Historical Provider, MD       Allergies: Patient has no known allergies. Social History:   TOBACCO:   reports that she has quit smoking. Her smoking use included cigarettes. She smoked 0.50 packs per day. She has never used smokeless tobacco.  All smokers should join the free smoking cessation program and stop smoking before having surgery. ETOH:    reports previous alcohol use. Problem Relation Age of Onset    Thyroid Disease Mother     No Known Problems Father     Stroke Maternal Grandfather     Diabetes Maternal Grandfather        Review of Systems:  Psychiatric: denies depression and anxiety  Respiratory: negative  Cardiovascular: negative  Gastrointestinal: negative  Musculoskeletal:negative  All others reviewed, negative    Physical Exam:   VITALS: Blood pressure 116/73, pulse 89, temperature 98.2 °F (36.8 °C), temperature source Oral, height 5' 5\" (1.651 m), weight 240 lb (108.9 kg).   General appearance: alert, appears stated age and cooperative, does ambulate easily  Head: Normocephalic, without obvious abnormality, atraumatic  Eyes: PERRL  Ears/mouth/throat:  Ears clear, mouth normal, throat no redness  Neck: no adenopathy, no JVD, supple, symmetrical, trachea midline and thyroid not enlarged  Lungs: clear to auscultation bilaterally  Heart: regular rate and rhythm  Abdomen: soft, non-tender; bowel sounds normal; no masses,  no organomegaly  Extremities: extremities normal, atraumatic, no cyanosis or edema  Skin: no open wounds    ASSESSMENT: pilonidal cyst abscess    PLAN: clean and pack wound daily. Finish the clindamycin. Follow up in 2 weeks to discuss surgical excision. Signed: Dr. Harjeet Shultz M.D.     Send copy of consult to PCP, Jethro Prescott DO

## 2020-02-24 PROBLEM — E66.01 MORBIDLY OBESE (HCC): Status: ACTIVE | Noted: 2020-02-24

## 2020-02-24 ASSESSMENT — ENCOUNTER SYMPTOMS
EYE PAIN: 0
SHORTNESS OF BREATH: 0
COUGH: 0
DIARRHEA: 0
CONSTIPATION: 0
CHOKING: 0
APNEA: 0
NAUSEA: 0
EYE REDNESS: 0
EYE ITCHING: 0
COLOR CHANGE: 0
SINUS PRESSURE: 0
BLOOD IN STOOL: 0
RHINORRHEA: 0
VOICE CHANGE: 0
TROUBLE SWALLOWING: 0
VOMITING: 0
ABDOMINAL DISTENTION: 0
BACK PAIN: 0
ABDOMINAL PAIN: 0
RECTAL PAIN: 0
EYE DISCHARGE: 0
STRIDOR: 0
CHEST TIGHTNESS: 0
WHEEZING: 0
SORE THROAT: 0
FACIAL SWELLING: 0
ANAL BLEEDING: 0
PHOTOPHOBIA: 0

## 2020-02-24 NOTE — PROGRESS NOTES
Jacob Medina is a 21 y.o. female  . Subjective:      Pilonidal cyst was drained and packed at ER. We removed packing today and redressed. Patient was started on clindamycin in ER. Appointment made with surgery for tomorrow. Review of Systems   Constitutional: Negative for activity change, appetite change, chills, diaphoresis, fatigue, fever and unexpected weight change. HENT: Negative for congestion, dental problem, drooling, ear discharge, ear pain, facial swelling, hearing loss, mouth sores, nosebleeds, postnasal drip, rhinorrhea, sinus pressure, sneezing, sore throat, tinnitus, trouble swallowing and voice change. Eyes: Negative for photophobia, pain, discharge, redness, itching and visual disturbance. Respiratory: Negative for apnea, cough, choking, chest tightness, shortness of breath, wheezing and stridor. Cardiovascular: Negative for chest pain, palpitations and leg swelling. Gastrointestinal: Negative for abdominal distention, abdominal pain, anal bleeding, blood in stool, constipation, diarrhea, nausea, rectal pain and vomiting. Endocrine: Negative for cold intolerance, heat intolerance, polydipsia, polyphagia and polyuria. Genitourinary: Negative for decreased urine volume, difficulty urinating, dyspareunia, dysuria, enuresis, flank pain, frequency, genital sores, hematuria, menstrual problem, pelvic pain, urgency, vaginal bleeding, vaginal discharge and vaginal pain. Musculoskeletal: Negative for arthralgias, back pain, gait problem, joint swelling, myalgias, neck pain and neck stiffness. Skin: Negative for color change, pallor, rash and wound. Pilonidal cyst/abcess    Allergic/Immunologic: Negative for environmental allergies, food allergies and immunocompromised state. Neurological: Negative for dizziness, tremors, seizures, syncope, facial asymmetry, speech difficulty, weakness, light-headedness, numbness and headaches.    Hematological: Negative for Father     Stroke Maternal Grandfather     Diabetes Maternal Grandfather        Current Outpatient Medications on File Prior to Visit   Medication Sig Dispense Refill    clindamycin (CLEOCIN) 150 MG capsule Take 3 capsules by mouth 3 times daily for 7 days 63 capsule 0    Prenatal MV-Min-Fe Fum-FA-DHA (PRENATAL 1 PO) Take by mouth       No current facility-administered medications on file prior to visit. No Known Allergies    I have reviewedher allergies, medications, problem list, medical, social and family history and have updated as needed in the electronic medical record. Objective:     Physical Exam  Vitals signs and nursing note reviewed. Constitutional:       General: She is not in acute distress. Appearance: She is well-developed. She is not diaphoretic. HENT:      Head: Normocephalic and atraumatic. Right Ear: External ear normal.      Left Ear: External ear normal.      Nose: Nose normal.      Mouth/Throat:      Pharynx: No oropharyngeal exudate. Eyes:      General: No scleral icterus. Right eye: No discharge. Left eye: No discharge. Conjunctiva/sclera: Conjunctivae normal.      Pupils: Pupils are equal, round, and reactive to light. Neck:      Musculoskeletal: Normal range of motion and neck supple. Thyroid: No thyromegaly. Vascular: No JVD. Trachea: No tracheal deviation. Cardiovascular:      Rate and Rhythm: Normal rate and regular rhythm. Heart sounds: Normal heart sounds. No murmur. No friction rub. No gallop. Pulmonary:      Effort: Pulmonary effort is normal. No respiratory distress. Breath sounds: Normal breath sounds. No stridor. No wheezing or rales. Chest:      Chest wall: No tenderness. Abdominal:      General: Bowel sounds are normal. There is no distension. Palpations: Abdomen is soft. There is no mass. Tenderness: There is no abdominal tenderness. There is no guarding or rebound.    Genitourinary: Comments: Will follow with own gynecologist for gynecological and breast care. Musculoskeletal: Normal range of motion. General: No tenderness. Lymphadenopathy:      Cervical: No cervical adenopathy. Skin:     General: Skin is warm and dry. Coloration: Skin is not pale. Findings: No erythema or rash. Comments: Pilonidal cyst / abscess     Neurological:      Mental Status: She is alert and oriented to person, place, and time. Cranial Nerves: No cranial nerve deficit. Motor: No abnormal muscle tone. Coordination: Coordination normal.      Deep Tendon Reflexes: Reflexes are normal and symmetric. Reflexes normal.   Psychiatric:         Behavior: Behavior normal.         Thought Content: Thought content normal.         Judgment: Judgment normal.         Assessment / Plan:   Tari Wheatley was seen today for ed follow-up. Diagnoses and all orders for this visit:    Pilonidal cyst with abscess  -     Alma Martins MD, General Surgery, [place]    Morbidly obese [facility])        Key Garcia with own gynecologist for gynecological and breast care. Reviewed health maintenance report. Patient is aware of deficiencies and suggested preventative tests.

## 2020-03-06 ENCOUNTER — OFFICE VISIT (OUTPATIENT)
Dept: SURGERY | Age: 25
End: 2020-03-06
Payer: COMMERCIAL

## 2020-03-06 VITALS
HEIGHT: 65 IN | WEIGHT: 240 LBS | DIASTOLIC BLOOD PRESSURE: 77 MMHG | BODY MASS INDEX: 39.99 KG/M2 | TEMPERATURE: 98.1 F | HEART RATE: 95 BPM | SYSTOLIC BLOOD PRESSURE: 130 MMHG

## 2020-03-06 PROCEDURE — G8427 DOCREV CUR MEDS BY ELIG CLIN: HCPCS | Performed by: SURGERY

## 2020-03-06 PROCEDURE — G8419 CALC BMI OUT NRM PARAM NOF/U: HCPCS | Performed by: SURGERY

## 2020-03-06 PROCEDURE — 1036F TOBACCO NON-USER: CPT | Performed by: SURGERY

## 2020-03-06 PROCEDURE — 99213 OFFICE O/P EST LOW 20 MIN: CPT | Performed by: SURGERY

## 2020-03-06 PROCEDURE — G8484 FLU IMMUNIZE NO ADMIN: HCPCS | Performed by: SURGERY

## 2020-03-06 NOTE — PROGRESS NOTES
PERRL  Ears/mouth/throat:  Ears clear, mouth normal, throat no redness  Neck: no adenopathy, no JVD, supple, symmetrical, trachea midline and thyroid not enlarged  Lungs: clear to auscultation bilaterally  Heart: regular rate and rhythm  Abdomen: soft, non-tender; bowel sounds normal; no masses,  no organomegaly  Extremities: extremities normal, atraumatic, no cyanosis or edema  Skin: no open wounds    ASSESSMENT: pilonidal cyst     PLAN:   Will wait to discuss surgical excision until after the baby is born. Signed: Dr. Mabel Dejesus M.D.     Send copy of consult to PCP, Jovany Dumont DO

## 2020-03-25 ENCOUNTER — APPOINTMENT (OUTPATIENT)
Dept: LABOR AND DELIVERY | Age: 25
DRG: 560 | End: 2020-03-25
Payer: COMMERCIAL

## 2020-03-25 ENCOUNTER — HOSPITAL ENCOUNTER (INPATIENT)
Age: 25
LOS: 2 days | Discharge: HOME | DRG: 560 | End: 2020-03-27
Attending: OBSTETRICS & GYNECOLOGY | Admitting: OBSTETRICS & GYNECOLOGY
Payer: COMMERCIAL

## 2020-03-25 PROBLEM — Z34.93 NORMAL PREGNANCY IN THIRD TRIMESTER: Status: ACTIVE | Noted: 2020-03-25

## 2020-03-25 LAB
ABO/RH: NORMAL
AMPHETAMINE SCREEN, URINE: NOT DETECTED
ANION GAP SERPL CALCULATED.3IONS-SCNC: 13 MMOL/L (ref 7–16)
ANTIBODY SCREEN: NORMAL
BARBITURATE SCREEN URINE: NOT DETECTED
BASOPHILS ABSOLUTE: 0.01 E9/L (ref 0–0.2)
BASOPHILS RELATIVE PERCENT: 0.1 % (ref 0–2)
BENZODIAZEPINE SCREEN, URINE: NOT DETECTED
BUN BLDV-MCNC: 10 MG/DL (ref 6–20)
CALCIUM SERPL-MCNC: 9.3 MG/DL (ref 8.6–10.2)
CANNABINOID SCREEN URINE: NOT DETECTED
CHLORIDE BLD-SCNC: 104 MMOL/L (ref 98–107)
CO2: 20 MMOL/L (ref 22–29)
COCAINE METABOLITE SCREEN URINE: NOT DETECTED
CREAT SERPL-MCNC: 0.7 MG/DL (ref 0.5–1)
EOSINOPHILS ABSOLUTE: 0.08 E9/L (ref 0.05–0.5)
EOSINOPHILS RELATIVE PERCENT: 1 % (ref 0–6)
FENTANYL SCREEN, URINE: NOT DETECTED
GFR AFRICAN AMERICAN: >60
GFR NON-AFRICAN AMERICAN: >60 ML/MIN/1.73
GLUCOSE BLD-MCNC: 103 MG/DL (ref 74–99)
HCT VFR BLD CALC: 34.1 % (ref 34–48)
HEMOGLOBIN: 10.9 G/DL (ref 11.5–15.5)
IMMATURE GRANULOCYTES #: 0.09 E9/L
IMMATURE GRANULOCYTES %: 1.1 % (ref 0–5)
LYMPHOCYTES ABSOLUTE: 1 E9/L (ref 1.5–4)
LYMPHOCYTES RELATIVE PERCENT: 12 % (ref 20–42)
Lab: NORMAL
MCH RBC QN AUTO: 26.5 PG (ref 26–35)
MCHC RBC AUTO-ENTMCNC: 32 % (ref 32–34.5)
MCV RBC AUTO: 82.8 FL (ref 80–99.9)
METHADONE SCREEN, URINE: NOT DETECTED
MONOCYTES ABSOLUTE: 0.86 E9/L (ref 0.1–0.95)
MONOCYTES RELATIVE PERCENT: 10.3 % (ref 2–12)
NEUTROPHILS ABSOLUTE: 6.3 E9/L (ref 1.8–7.3)
NEUTROPHILS RELATIVE PERCENT: 75.5 % (ref 43–80)
OPIATE SCREEN URINE: NOT DETECTED
OXYCODONE URINE: NOT DETECTED
PDW BLD-RTO: 14.9 FL (ref 11.5–15)
PHENCYCLIDINE SCREEN URINE: NOT DETECTED
PLATELET # BLD: 185 E9/L (ref 130–450)
PMV BLD AUTO: 11.9 FL (ref 7–12)
POTASSIUM SERPL-SCNC: 4.3 MMOL/L (ref 3.5–5)
RBC # BLD: 4.12 E12/L (ref 3.5–5.5)
SODIUM BLD-SCNC: 137 MMOL/L (ref 132–146)
WBC # BLD: 8.3 E9/L (ref 4.5–11.5)

## 2020-03-25 PROCEDURE — 86901 BLOOD TYPING SEROLOGIC RH(D): CPT

## 2020-03-25 PROCEDURE — 6360000002 HC RX W HCPCS: Performed by: OBSTETRICS & GYNECOLOGY

## 2020-03-25 PROCEDURE — 80307 DRUG TEST PRSMV CHEM ANLYZR: CPT

## 2020-03-25 PROCEDURE — 86900 BLOOD TYPING SEROLOGIC ABO: CPT

## 2020-03-25 PROCEDURE — 86850 RBC ANTIBODY SCREEN: CPT

## 2020-03-25 PROCEDURE — 85025 COMPLETE CBC W/AUTO DIFF WBC: CPT

## 2020-03-25 PROCEDURE — 36415 COLL VENOUS BLD VENIPUNCTURE: CPT

## 2020-03-25 PROCEDURE — 80048 BASIC METABOLIC PNL TOTAL CA: CPT

## 2020-03-25 PROCEDURE — 2580000003 HC RX 258: Performed by: OBSTETRICS & GYNECOLOGY

## 2020-03-25 PROCEDURE — 1220000001 HC SEMI PRIVATE L&D R&B

## 2020-03-25 RX ORDER — SODIUM CHLORIDE, SODIUM LACTATE, POTASSIUM CHLORIDE, CALCIUM CHLORIDE 600; 310; 30; 20 MG/100ML; MG/100ML; MG/100ML; MG/100ML
INJECTION, SOLUTION INTRAVENOUS CONTINUOUS
Status: DISCONTINUED | OUTPATIENT
Start: 2020-03-25 | End: 2020-03-27 | Stop reason: HOSPADM

## 2020-03-25 RX ORDER — ONDANSETRON 2 MG/ML
4 INJECTION INTRAMUSCULAR; INTRAVENOUS EVERY 6 HOURS PRN
Status: DISCONTINUED | OUTPATIENT
Start: 2020-03-25 | End: 2020-03-27 | Stop reason: HOSPADM

## 2020-03-25 RX ORDER — SODIUM CHLORIDE 0.9 % (FLUSH) 0.9 %
10 SYRINGE (ML) INJECTION EVERY 12 HOURS SCHEDULED
Status: DISCONTINUED | OUTPATIENT
Start: 2020-03-25 | End: 2020-03-26 | Stop reason: HOSPADM

## 2020-03-25 RX ORDER — SODIUM CHLORIDE 0.9 % (FLUSH) 0.9 %
10 SYRINGE (ML) INJECTION PRN
Status: DISCONTINUED | OUTPATIENT
Start: 2020-03-25 | End: 2020-03-26 | Stop reason: HOSPADM

## 2020-03-25 RX ORDER — DOCUSATE SODIUM 100 MG/1
100 CAPSULE, LIQUID FILLED ORAL 2 TIMES DAILY
Status: DISCONTINUED | OUTPATIENT
Start: 2020-03-25 | End: 2020-03-26 | Stop reason: HOSPADM

## 2020-03-25 RX ADMIN — Medication 2 MILLI-UNITS/MIN: at 21:00

## 2020-03-25 RX ADMIN — SODIUM CHLORIDE, POTASSIUM CHLORIDE, SODIUM LACTATE AND CALCIUM CHLORIDE: 600; 310; 30; 20 INJECTION, SOLUTION INTRAVENOUS at 20:45

## 2020-03-25 ASSESSMENT — PAIN SCALES - GENERAL: PAINLEVEL_OUTOF10: 0

## 2020-03-26 ENCOUNTER — ANESTHESIA (OUTPATIENT)
Dept: LABOR AND DELIVERY | Age: 25
DRG: 560 | End: 2020-03-26
Payer: COMMERCIAL

## 2020-03-26 ENCOUNTER — ANESTHESIA EVENT (OUTPATIENT)
Dept: LABOR AND DELIVERY | Age: 25
DRG: 560 | End: 2020-03-26
Payer: COMMERCIAL

## 2020-03-26 PROCEDURE — 6360000002 HC RX W HCPCS: Performed by: OBSTETRICS & GYNECOLOGY

## 2020-03-26 PROCEDURE — 2500000003 HC RX 250 WO HCPCS

## 2020-03-26 PROCEDURE — 3E033VJ INTRODUCTION OF OTHER HORMONE INTO PERIPHERAL VEIN, PERCUTANEOUS APPROACH: ICD-10-PCS | Performed by: OBSTETRICS & GYNECOLOGY

## 2020-03-26 PROCEDURE — 6370000000 HC RX 637 (ALT 250 FOR IP): Performed by: OBSTETRICS & GYNECOLOGY

## 2020-03-26 PROCEDURE — 1220000001 HC SEMI PRIVATE L&D R&B

## 2020-03-26 PROCEDURE — 59050 FETAL MONITOR W/REPORT: CPT

## 2020-03-26 PROCEDURE — 3700000025 EPIDURAL BLOCK: Performed by: ANESTHESIOLOGY

## 2020-03-26 PROCEDURE — 7200000001 HC VAGINAL DELIVERY

## 2020-03-26 RX ORDER — SODIUM CHLORIDE, SODIUM LACTATE, POTASSIUM CHLORIDE, AND CALCIUM CHLORIDE .6; .31; .03; .02 G/100ML; G/100ML; G/100ML; G/100ML
1000 INJECTION, SOLUTION INTRAVENOUS ONCE
Status: DISCONTINUED | OUTPATIENT
Start: 2020-03-26 | End: 2020-03-27 | Stop reason: HOSPADM

## 2020-03-26 RX ORDER — DOCUSATE SODIUM 100 MG/1
100 CAPSULE, LIQUID FILLED ORAL 2 TIMES DAILY
Status: DISCONTINUED | OUTPATIENT
Start: 2020-03-26 | End: 2020-03-27 | Stop reason: HOSPADM

## 2020-03-26 RX ORDER — FERROUS SULFATE 325(65) MG
325 TABLET ORAL 2 TIMES DAILY WITH MEALS
Status: DISCONTINUED | OUTPATIENT
Start: 2020-03-26 | End: 2020-03-27 | Stop reason: HOSPADM

## 2020-03-26 RX ORDER — LANOLIN 100 %
OINTMENT (GRAM) TOPICAL PRN
Status: DISCONTINUED | OUTPATIENT
Start: 2020-03-26 | End: 2020-03-27 | Stop reason: HOSPADM

## 2020-03-26 RX ORDER — ACETAMINOPHEN 325 MG/1
650 TABLET ORAL EVERY 4 HOURS PRN
Status: DISCONTINUED | OUTPATIENT
Start: 2020-03-26 | End: 2020-03-27 | Stop reason: HOSPADM

## 2020-03-26 RX ORDER — OXYCODONE HYDROCHLORIDE AND ACETAMINOPHEN 5; 325 MG/1; MG/1
2 TABLET ORAL EVERY 4 HOURS PRN
Status: DISCONTINUED | OUTPATIENT
Start: 2020-03-26 | End: 2020-03-27 | Stop reason: HOSPADM

## 2020-03-26 RX ORDER — IBUPROFEN 800 MG/1
800 TABLET ORAL EVERY 8 HOURS
Status: DISCONTINUED | OUTPATIENT
Start: 2020-03-26 | End: 2020-03-27 | Stop reason: HOSPADM

## 2020-03-26 RX ORDER — SODIUM CHLORIDE 0.9 % (FLUSH) 0.9 %
10 SYRINGE (ML) INJECTION PRN
Status: DISCONTINUED | OUTPATIENT
Start: 2020-03-26 | End: 2020-03-27 | Stop reason: HOSPADM

## 2020-03-26 RX ORDER — METHYLERGONOVINE MALEATE 0.2 MG/ML
200 INJECTION INTRAVENOUS ONCE
Status: COMPLETED | OUTPATIENT
Start: 2020-03-26 | End: 2020-03-26

## 2020-03-26 RX ORDER — SODIUM CHLORIDE 0.9 % (FLUSH) 0.9 %
10 SYRINGE (ML) INJECTION EVERY 12 HOURS SCHEDULED
Status: DISCONTINUED | OUTPATIENT
Start: 2020-03-26 | End: 2020-03-27 | Stop reason: HOSPADM

## 2020-03-26 RX ORDER — OXYCODONE HYDROCHLORIDE AND ACETAMINOPHEN 5; 325 MG/1; MG/1
1 TABLET ORAL EVERY 4 HOURS PRN
Status: DISCONTINUED | OUTPATIENT
Start: 2020-03-26 | End: 2020-03-27 | Stop reason: HOSPADM

## 2020-03-26 RX ADMIN — DOCUSATE SODIUM 100 MG: 100 CAPSULE, LIQUID FILLED ORAL at 22:16

## 2020-03-26 RX ADMIN — IBUPROFEN 800 MG: 800 TABLET, FILM COATED ORAL at 15:19

## 2020-03-26 RX ADMIN — METHYLERGONOVINE MALEATE 200 MCG: 0.2 INJECTION, SOLUTION INTRAMUSCULAR; INTRAVENOUS at 10:32

## 2020-03-26 RX ADMIN — Medication: at 15:19

## 2020-03-26 RX ADMIN — BUTORPHANOL TARTRATE 1 MG: 2 INJECTION, SOLUTION INTRAMUSCULAR; INTRAVENOUS at 07:44

## 2020-03-26 RX ADMIN — BENZOCAINE AND LEVOMENTHOL: 200; 5 SPRAY TOPICAL at 15:19

## 2020-03-26 ASSESSMENT — PAIN SCALES - GENERAL
PAINLEVEL_OUTOF10: 0
PAINLEVEL_OUTOF10: 8
PAINLEVEL_OUTOF10: 7

## 2020-03-26 ASSESSMENT — PAIN DESCRIPTION - DESCRIPTORS: DESCRIPTORS: CRAMPING

## 2020-03-26 NOTE — ANESTHESIA PROCEDURE NOTES
Epidural Block    Patient location during procedure: OB  Start time: 3/26/2020 8:45 AM  End time: 3/26/2020 8:50 AM  Reason for block: labor epidural  Staffing  Anesthesiologist: Edilson Richardson MD  Resident/CRNA: PRIYANKA Barker CRNA  Performed: resident/CRNA   Preanesthetic Checklist  Completed: patient identified, site marked, surgical consent, pre-op evaluation, timeout performed, IV checked, risks and benefits discussed, monitors and equipment checked, anesthesia consent given, oxygen available and patient being monitored  Epidural  Patient position: sitting  Prep: ChloraPrep  Patient monitoring: cardiac monitor, continuous pulse ox and frequent blood pressure checks  Approach: midline  Location: lumbar (1-5)  Injection technique: MILY air  Provider prep: mask and sterile gloves  Needle  Needle type: Tuohy   Needle gauge: 18 G  Needle length: 2.5 in  Needle insertion depth: 7 cm  Catheter type: end hole  Catheter size: 20 G.   Catheter at skin depth: 12 cm  Test dose: negative  Assessment  Sensory level: T6  Hemodynamics: stable  Attempts: 1

## 2020-03-26 NOTE — PROGRESS NOTES
Assumed care of patient for 7am - 7pm shift. Plan of care discussed and agreed upon. Assessment completed and charted. All needs addressed at this time. Call light with in reach.

## 2020-03-26 NOTE — PROGRESS NOTES
Patient is a 21year old , EDC 2020 39.3 weeka. She presented ambulatory to the Labor and Delivery unit for a scheduled IOL. Patient taken to LDR 8 EFM applied and plan of care discussed. Patient denies any vaginal bleeding or leakage of fluid and reports normal fetal movement.

## 2020-03-26 NOTE — ANESTHESIA POSTPROCEDURE EVALUATION
Department of Anesthesiology  Postprocedure Note    Patient: Vy Jensen  MRN: [id number]  YOB: 1995  Date of evaluation: 3/26/2020  Time:  3:51 PM     Procedure Summary     Date:  03/26/20 Room / Location:      Anesthesia Start:  0842 Anesthesia Stop:  1020    Procedure:  Labor Analgesia Diagnosis:      Scheduled Providers:   Responsible Provider:  Edilson Richardson MD    Anesthesia Type:  epidural ASA Status:  2          Anesthesia Type: epidural    Karen Phase I:      Karen Phase II:      Last vitals: Reviewed and per EMR flowsheets.        Anesthesia Post Evaluation    Patient location during evaluation: bedside  Patient participation: complete - patient participated  Level of consciousness: awake  Pain score: 3  Airway patency: patent  Nausea & Vomiting: no nausea  Complications: no  Cardiovascular status: blood pressure returned to baseline  Respiratory status: acceptable  Hydration status: euvolemic

## 2020-03-26 NOTE — ANESTHESIA PRE PROCEDURE
Department of Anesthesiology  Preprocedure Note       Name:  Khurram Nieto   Age:  21 y.o.  :  1995                                          MRN:  [id number]         Date:  3/26/2020      Surgeon: * No surgeons listed *    Procedure: Labor Analgesia    Medications prior to admission:   Prior to Admission medications    Medication Sig Start Date End Date Taking?  Authorizing Provider   diphenhydrAMINE (BENADRYL) 25 MG tablet Take 25 mg by mouth nightly as needed for Itching   Yes Historical Provider, MD   Prenatal MV-Min-Fe Fum-FA-DHA (PRENATAL 1 PO) Take by mouth   Yes Historical Provider, MD       Current medications:    Current Facility-Administered Medications   Medication Dose Route Frequency Provider Last Rate Last Dose    lactated ringers infusion   Intravenous Continuous Elena Castano  mL/hr at 20      sodium chloride flush 0.9 % injection 10 mL  10 mL Intravenous 2 times per day Elena Castano MD        sodium chloride flush 0.9 % injection 10 mL  10 mL Intravenous PRN Elena Castano MD        docusate sodium (COLACE) capsule 100 mg  100 mg Oral BID Elena Castano MD        ondansetron [facility]) injection 4 mg  4 mg Intravenous Q6H PRN Elena Castano MD        oxytocin (PITOCIN) 30 units in 500 mL infusion  1 dayne-units/min Intravenous Continuous PRN Elena Castano MD        butorphanol (STADOL) injection 1 mg  1 mg Intravenous Q3H PRN Elena Castano MD        oxytocin (PITOCIN) 30 units in 500 mL infusion  1 dayne-units/min Intravenous Continuous Elena Castano MD 20 mL/hr at 20 0730 20 dayne-units/min at 20 0730       Allergies:  No Known Allergies    Problem List:    Patient Active Problem List   Diagnosis Code    21 weeks gestation of pregnancy Z3A.21    Family history of VSD (ventricular septal defect) Z82.79    Encounter for fetal anatomic survey Z36.89    29 weeks gestation of pregnancy Z3A.29    34 weeks gestation of pregnancy Z3A.34    Pilonidal cyst L05.91    Morbidly obese (HCC) E66.01    Normal pregnancy in third trimester Z34.93       Past Medical History:        Diagnosis Date    Complication of anesthesia     hx of waking during anesthesia       Past Surgical History:        Procedure Laterality Date    SALIVARY GLAND SURGERY  12/2011 5/2012    left submandibulat abscess       Social History:    Social History     Tobacco Use    Smoking status: Former Smoker     Packs/day: 0.50     Types: Cigarettes    Smokeless tobacco: Never Used    Tobacco comment: quit 6 years ago   Substance Use Topics    Alcohol use: Not Currently     Comment: occ                                Counseling given: Not Answered  Comment: quit 6 years ago      Vital Signs (Current):   Vitals:    03/26/20 0531 03/26/20 0600 03/26/20 0630 03/26/20 0700   BP: 133/78 124/66 (!) 148/90 (!) 141/83   Pulse: 70 70 88 86   Resp: 16 15 14 14   Temp:    97.9 °F (36.6 °C)   TempSrc:    Oral   Weight:       Height:                                                  BP Readings from Last 3 Encounters:   03/26/20 (!) 141/83   03/06/20 130/77   02/21/20 116/73       NPO Status:                                                                                 BMI:   Wt Readings from Last 3 Encounters:   03/25/20 250 lb (113.4 kg)   03/06/20 240 lb (108.9 kg)   02/21/20 240 lb (108.9 kg)     Body mass index is 40.35 kg/m².     CBC:   Lab Results   Component Value Date    WBC 8.3 03/25/2020    RBC 4.12 03/25/2020    HGB 10.9 03/25/2020    HCT 34.1 03/25/2020    MCV 82.8 03/25/2020    RDW 14.9 03/25/2020     03/25/2020       CMP:   Lab Results   Component Value Date     03/25/2020    K 4.3 03/25/2020     03/25/2020    CO2 20 03/25/2020    BUN 10 03/25/2020    CREATININE 0.7 03/25/2020    GFRAA >60 03/25/2020    LABGLOM >60 03/25/2020    GLUCOSE 103 03/25/2020    PROT 7.9 08/03/2018    CALCIUM 9.3 03/25/2020    BILITOT 0.4 08/03/2018    ALKPHOS 108 08/03/2018    AST 16 08/03/2018    ALT 12 08/03/2018       POC Tests: No results for input(s): POCGLU, POCNA, POCK, POCCL, POCBUN, POCHEMO, POCHCT in the last 72 hours. Coags: No results found for: PROTIME, INR, APTT    HCG (If Applicable):   Lab Results   Component Value Date    PREGSERUM NEGATIVE 12/17/2012        ABGs: No results found for: PHART, PO2ART, JQS6MTU, IMF3TMH, BEART, C8SSYAOC     Type & Screen (If Applicable):  No results found for: LABABO, [address]    Anesthesia Evaluation  Patient summary reviewed  Airway: Mallampati: II  TM distance: >3 FB   Neck ROM: full  Mouth opening: > = 3 FB Dental: normal exam         Pulmonary:Negative Pulmonary ROS breath sounds clear to auscultation                             Cardiovascular:Negative CV ROS            Rhythm: regular  Rate: normal                    Neuro/Psych:   Negative Neuro/Psych ROS              GI/Hepatic/Renal: Neg GI/Hepatic/Renal ROS            Endo/Other: Negative Endo/Other ROS                    Abdominal:       Abdomen: soft. Vascular:                                        Anesthesia Plan      epidural     ASA 2       Induction: intravenous. Anesthetic plan and risks discussed with patient. Plan discussed with CRNA.                   Rik Esteban MD   3/26/2020

## 2020-03-26 NOTE — PLAN OF CARE
Problem: Anxiety:  Goal: Level of anxiety will decrease  Description: Level of anxiety will decrease  3/26/2020 0734 by Omid Anderson RN  Outcome: Met This Shift  3/25/2020 2044 by Curtis Mcmahon RN  Outcome: Met This Shift     Problem: Breathing Pattern - Ineffective:  Goal: Able to breathe comfortably  Description: Able to breathe comfortably  3/26/2020 0734 by Omid Anderson RN  Outcome: Met This Shift  3/25/2020 2044 by Curtis Mcmahon RN  Outcome: Met This Shift     Problem: Fluid Volume - Imbalance:  Goal: Absence of imbalanced fluid volume signs and symptoms  Description: Absence of imbalanced fluid volume signs and symptoms  Outcome: Met This Shift  Goal: Absence of intrapartum hemorrhage signs and symptoms  Description: Absence of intrapartum hemorrhage signs and symptoms  Outcome: Met This Shift     Problem: Infection - Intrapartum Infection:  Goal: Will show no infection signs and symptoms  Description: Will show no infection signs and symptoms  3/26/2020 0734 by Omid Anderson RN  Outcome: Met This Shift  3/25/2020 2044 by Curtis Mcmahon RN  Outcome: Met This Shift     Problem: Labor Process - Prolonged:  Goal: Labor progression, first stage, within specified pattern  Description: Labor progression, first stage, within specified pattern  Outcome: Met This Shift  Goal: Labor progession, second stage, within specified pattern  Description: Labor progession, second stage, within specified pattern  Outcome: Met This Shift  Goal: Uterine contractions within specified parameters  Description: Uterine contractions within specified parameters  Outcome: Met This Shift     Problem:  Screening:  Goal: Ability to make informed decisions regarding treatment has improved  Description: Ability to make informed decisions regarding treatment has improved  Outcome: Met This Shift     Problem: Pain - Acute:  Goal: Pain level will decrease  Description:

## 2020-03-26 NOTE — PROGRESS NOTES
Babygirl born at 56. Apgars 8 / 9 . Weight   9 Lb 6 Oz. Spontaneous respirations & lusty cry. Skin to Skin initiated between mother & baby. Baby alert, color pink, respirations regular. Patient & SO educated on safe skin to skin practice with proper positioning of baby & mother, maintain mother's HOB elevated and assurance of unobstructed airway. Verbalized understanding with no questions asked.

## 2020-03-27 VITALS
HEIGHT: 66 IN | BODY MASS INDEX: 40.18 KG/M2 | WEIGHT: 250 LBS | RESPIRATION RATE: 18 BRPM | HEART RATE: 71 BPM | TEMPERATURE: 98 F | SYSTOLIC BLOOD PRESSURE: 143 MMHG | DIASTOLIC BLOOD PRESSURE: 89 MMHG

## 2020-03-27 PROBLEM — Z3A.21 21 WEEKS GESTATION OF PREGNANCY: Status: RESOLVED | Noted: 2019-11-21 | Resolved: 2020-03-27

## 2020-03-27 PROBLEM — Z34.93 NORMAL PREGNANCY IN THIRD TRIMESTER: Status: RESOLVED | Noted: 2020-03-25 | Resolved: 2020-03-27

## 2020-03-27 PROBLEM — Z3A.34 34 WEEKS GESTATION OF PREGNANCY: Status: RESOLVED | Noted: 2020-02-18 | Resolved: 2020-03-27

## 2020-03-27 PROBLEM — Z3A.29 29 WEEKS GESTATION OF PREGNANCY: Status: RESOLVED | Noted: 2020-01-16 | Resolved: 2020-03-27

## 2020-03-27 PROBLEM — Z36.89 ENCOUNTER FOR FETAL ANATOMIC SURVEY: Status: RESOLVED | Noted: 2019-11-21 | Resolved: 2020-03-27

## 2020-03-27 LAB
HCT VFR BLD CALC: 34.1 % (ref 34–48)
HEMOGLOBIN: 10.7 G/DL (ref 11.5–15.5)

## 2020-03-27 PROCEDURE — 36415 COLL VENOUS BLD VENIPUNCTURE: CPT

## 2020-03-27 PROCEDURE — 85018 HEMOGLOBIN: CPT

## 2020-03-27 PROCEDURE — 99238 HOSP IP/OBS DSCHRG MGMT 30/<: CPT | Performed by: ADVANCED PRACTICE MIDWIFE

## 2020-03-27 PROCEDURE — 6370000000 HC RX 637 (ALT 250 FOR IP): Performed by: OBSTETRICS & GYNECOLOGY

## 2020-03-27 PROCEDURE — 85014 HEMATOCRIT: CPT

## 2020-03-27 RX ADMIN — DOCUSATE SODIUM 100 MG: 100 CAPSULE, LIQUID FILLED ORAL at 07:33

## 2020-03-27 RX ADMIN — IBUPROFEN 800 MG: 800 TABLET, FILM COATED ORAL at 00:22

## 2020-03-27 RX ADMIN — IBUPROFEN 800 MG: 800 TABLET, FILM COATED ORAL at 07:33

## 2020-03-27 ASSESSMENT — PAIN SCALES - GENERAL
PAINLEVEL_OUTOF10: 3
PAINLEVEL_OUTOF10: 3

## 2020-03-27 NOTE — PROGRESS NOTES
Reviewed with patient plan of care during recovery period. Instructed not to get out of bed without staff and the lingering effects of the epidural.  Verbalized understanding. No questions voiced when asked.

## 2020-03-27 NOTE — DISCHARGE SUMMARY
CNM Obstetrical Discharge Form         Patients Name  Donna Rodriguez    Gestational Age:  43w3d    Antepartum complications: none    Date of Delivery:   3-      Type of Delivery:   vaginal, spontaneous    Delivered By:                 Chao Johnson:       Information for the patient's :  Edwinna Bamberger [[id number]]        Anesthesia:    Epidural    No results found for: RUBELLAIGGQT   O POS        Intrapartum complications: None    Feeding method:   breast    Postpartum complications: none    Discharge Date:   3/27/2020  Condition:    Stable     Plan:   Follow up    in 6 week(s)

## 2021-05-27 ASSESSMENT — PAIN DESCRIPTION - LOCATION: LOCATION: THROAT

## 2021-05-27 ASSESSMENT — PAIN SCALES - GENERAL: PAINLEVEL_OUTOF10: 7

## 2021-05-28 ENCOUNTER — HOSPITAL ENCOUNTER (EMERGENCY)
Age: 26
Discharge: HOME | End: 2021-05-28
Attending: EMERGENCY MEDICINE
Payer: COMMERCIAL

## 2021-05-28 VITALS
DIASTOLIC BLOOD PRESSURE: 65 MMHG | RESPIRATION RATE: 20 BRPM | HEIGHT: 65 IN | SYSTOLIC BLOOD PRESSURE: 115 MMHG | HEART RATE: 100 BPM | OXYGEN SATURATION: 97 % | TEMPERATURE: 98.3 F | WEIGHT: 218 LBS | BODY MASS INDEX: 36.32 KG/M2

## 2021-05-28 DIAGNOSIS — J02.0 STREP PHARYNGITIS: Primary | ICD-10-CM

## 2021-05-28 LAB — STREP GRP A PCR: POSITIVE

## 2021-05-28 PROCEDURE — 99285 EMERGENCY DEPT VISIT HI MDM: CPT

## 2021-05-28 PROCEDURE — 87880 STREP A ASSAY W/OPTIC: CPT

## 2021-05-28 PROCEDURE — 6370000000 HC RX 637 (ALT 250 FOR IP): Performed by: EMERGENCY MEDICINE

## 2021-05-28 RX ORDER — AMOXICILLIN 250 MG/1
500 CAPSULE ORAL 3 TIMES DAILY
Qty: 60 CAPSULE | Refills: 0 | Status: SHIPPED | OUTPATIENT
Start: 2021-05-28 | End: 2021-06-01 | Stop reason: ALTCHOICE

## 2021-05-28 RX ORDER — AMOXICILLIN 250 MG/1
500 CAPSULE ORAL ONCE
Status: COMPLETED | OUTPATIENT
Start: 2021-05-28 | End: 2021-05-28

## 2021-05-28 RX ADMIN — AMOXICILLIN 500 MG: 250 CAPSULE ORAL at 04:12

## 2021-05-28 ASSESSMENT — ENCOUNTER SYMPTOMS
EYE REDNESS: 0
ABDOMINAL DISTENTION: 0
NAUSEA: 0
SHORTNESS OF BREATH: 0
DIARRHEA: 0
BACK PAIN: 0
EYE DISCHARGE: 0
EYE PAIN: 0
SINUS PRESSURE: 0
COUGH: 0
WHEEZING: 0
SORE THROAT: 1
VOMITING: 0

## 2021-05-28 NOTE — ED PROVIDER NOTES
Patient is a 30 y/o female who presents to the ED with a sore throat. Patient states \"I think I have tonsillitis. \" She had onset of a sore throat yesterday. She states that it has gradually worsened and is currently 7/10. She does report swollen glands. She denies any associated fever or cough. She is currently pregnant. She estimates that she is 4-6 weeks gestation. She denies any abdominal pain or vaginal bleeding. Review of Systems   Constitutional: Negative for chills and fever. HENT: Positive for sore throat. Negative for ear pain and sinus pressure. Eyes: Negative for pain, discharge and redness. Respiratory: Negative for cough, shortness of breath and wheezing. Cardiovascular: Negative for chest pain. Gastrointestinal: Negative for abdominal distention, diarrhea, nausea and vomiting. Genitourinary: Negative for dysuria and frequency. Musculoskeletal: Negative for arthralgias and back pain. Skin: Negative for rash and wound. Neurological: Negative for weakness and headaches. Hematological: Negative for adenopathy. All other systems reviewed and are negative. Physical Exam  Vitals and nursing note reviewed. Constitutional:       General: She is not in acute distress. Appearance: She is obese. HENT:      Head: Normocephalic and atraumatic. Mouth/Throat:      Mouth: Mucous membranes are moist.      Pharynx: Posterior oropharyngeal erythema present. No oropharyngeal exudate. Tonsils: No tonsillar exudate or tonsillar abscesses. Eyes:      Conjunctiva/sclera: Conjunctivae normal.      Pupils: Pupils are equal, round, and reactive to light. Cardiovascular:      Rate and Rhythm: Normal rate and regular rhythm. Heart sounds: No murmur heard. Pulmonary:      Effort: Pulmonary effort is normal. No respiratory distress. Breath sounds: Normal breath sounds. No stridor. No wheezing, rhonchi or rales.    Abdominal:      General: Bowel sounds are normal.      Palpations: Abdomen is soft. Tenderness: There is no abdominal tenderness. There is no guarding. Musculoskeletal:      Cervical back: Normal range of motion and neck supple. Lymphadenopathy:      Cervical: No cervical adenopathy. Skin:     General: Skin is warm and dry. Findings: No rash. Neurological:      Mental Status: She is alert and oriented to person, place, and time. Procedures     [facility]                --------------------------------------------- PAST HISTORY ---------------------------------------------  Past Medical History:  has a past medical history of Complication of anesthesia. Past Surgical History:  has a past surgical history that includes Salivary gland surgery (12/2011 5/2012). Social History:  reports that she has quit smoking. Her smoking use included cigarettes. She smoked 0.50 packs per day. She has never used smokeless tobacco. She reports previous alcohol use. She reports that she does not use drugs. Family History: family history includes Diabetes in her maternal grandfather; No Known Problems in her father; Stroke in her maternal grandfather; Thyroid Disease in her mother. The patients home medications have been reviewed. Allergies: Patient has no known allergies. -------------------------------------------------- RESULTS -------------------------------------------------  Labs:  Results for orders placed or performed during the hospital encounter of 05/28/21   Strep Screen Group A Throat    Specimen: Throat   Result Value Ref Range    Strep Grp A PCR POSITIVE Negative       Radiology:  No orders to display       ------------------------- NURSING NOTES AND VITALS REVIEWED ---------------------------  Date / Time Roomed:  5/28/2021  1:52 AM  ED Bed Assignment:  12/12    The nursing notes within the ED encounter and vital signs as below have been reviewed.    /65   Pulse 100   Temp 98.3 °F (36.8 °C) (Temporal)   Resp 20  5' 5\" (1.651 m)   Wt 218 lb (98.9 kg)   LMP 04/05/2021 (Approximate)   SpO2 97%   BMI 36.28 kg/m²   Oxygen Saturation Interpretation: Normal      ------------------------------------------ PROGRESS NOTES ------------------------------------------  I have spoken with the patient and discussed todays results, in addition to providing specific details for the plan of care and counseling regarding the diagnosis and prognosis. Their questions are answered at this time and they are agreeable with the plan. I discussed at length with them reasons for immediate return here for re evaluation. They will followup with primary care by calling their office tomorrow. --------------------------------- ADDITIONAL PROVIDER NOTES ---------------------------------  At this time the patient is without objective evidence of an acute process requiring hospitalization or inpatient management. They have remained hemodynamically stable throughout their entire ED visit and are stable for discharge with outpatient follow-up. The plan has been discussed in detail and they are aware of the specific conditions for emergent return, as well as the importance of follow-up. New Prescriptions    AMOXICILLIN (AMOXIL) 250 MG CAPSULE    Take 2 capsules by mouth 3 times daily for 10 days       Diagnosis:  1. Strep pharyngitis        Disposition:  Patient's disposition: Discharge to home  Patient's condition is stable.            [address] [facility],   05/28/21 [id number]

## 2021-06-01 ENCOUNTER — OFFICE VISIT (OUTPATIENT)
Dept: FAMILY MEDICINE CLINIC | Age: 26
End: 2021-06-01
Payer: COMMERCIAL

## 2021-06-01 VITALS
RESPIRATION RATE: 18 BRPM | HEIGHT: 65 IN | BODY MASS INDEX: 37.82 KG/M2 | SYSTOLIC BLOOD PRESSURE: 118 MMHG | HEART RATE: 72 BPM | DIASTOLIC BLOOD PRESSURE: 80 MMHG | WEIGHT: 227 LBS | OXYGEN SATURATION: 99 %

## 2021-06-01 DIAGNOSIS — R53.82 CHRONIC FATIGUE: ICD-10-CM

## 2021-06-01 DIAGNOSIS — Z3A.01 LESS THAN 8 WEEKS GESTATION OF PREGNANCY: ICD-10-CM

## 2021-06-01 DIAGNOSIS — Z3A.01 LESS THAN 8 WEEKS GESTATION OF PREGNANCY: Primary | ICD-10-CM

## 2021-06-01 LAB
ALBUMIN SERPL-MCNC: 4.1 G/DL (ref 3.5–5.2)
ALP BLD-CCNC: 83 U/L (ref 35–104)
ALT SERPL-CCNC: 17 U/L (ref 0–32)
ANION GAP SERPL CALCULATED.3IONS-SCNC: 13 MMOL/L (ref 7–16)
AST SERPL-CCNC: 17 U/L (ref 0–31)
BASOPHILS ABSOLUTE: 0.04 E9/L (ref 0–0.2)
BASOPHILS RELATIVE PERCENT: 0.5 % (ref 0–2)
BILIRUB SERPL-MCNC: <0.2 MG/DL (ref 0–1.2)
BUN BLDV-MCNC: 10 MG/DL (ref 6–20)
CALCIUM SERPL-MCNC: 10 MG/DL (ref 8.6–10.2)
CHLORIDE BLD-SCNC: 101 MMOL/L (ref 98–107)
CO2: 26 MMOL/L (ref 22–29)
CREAT SERPL-MCNC: 0.7 MG/DL (ref 0.5–1)
EOSINOPHILS ABSOLUTE: 0.14 E9/L (ref 0.05–0.5)
EOSINOPHILS RELATIVE PERCENT: 1.7 % (ref 0–6)
GFR AFRICAN AMERICAN: >60
GFR NON-AFRICAN AMERICAN: >60 ML/MIN/1.73
GLUCOSE BLD-MCNC: 87 MG/DL (ref 74–99)
HBA1C MFR BLD: 5.2 % (ref 4–5.6)
HCT VFR BLD CALC: 42.2 % (ref 34–48)
HEMOGLOBIN: 13.6 G/DL (ref 11.5–15.5)
IMMATURE GRANULOCYTES #: 0.04 E9/L
IMMATURE GRANULOCYTES %: 0.5 % (ref 0–5)
IRON SATURATION: 19 % (ref 15–50)
IRON: 68 MCG/DL (ref 37–145)
LYMPHOCYTES ABSOLUTE: 1.89 E9/L (ref 1.5–4)
LYMPHOCYTES RELATIVE PERCENT: 23.2 % (ref 20–42)
MCH RBC QN AUTO: 27.9 PG (ref 26–35)
MCHC RBC AUTO-ENTMCNC: 32.2 % (ref 32–34.5)
MCV RBC AUTO: 86.7 FL (ref 80–99.9)
MONOCYTES ABSOLUTE: 0.58 E9/L (ref 0.1–0.95)
MONOCYTES RELATIVE PERCENT: 7.1 % (ref 2–12)
NEUTROPHILS ABSOLUTE: 5.46 E9/L (ref 1.8–7.3)
NEUTROPHILS RELATIVE PERCENT: 67 % (ref 43–80)
PDW BLD-RTO: 13.2 FL (ref 11.5–15)
PLATELET # BLD: 226 E9/L (ref 130–450)
PMV BLD AUTO: 11.4 FL (ref 7–12)
POTASSIUM SERPL-SCNC: 4.4 MMOL/L (ref 3.5–5)
RBC # BLD: 4.87 E12/L (ref 3.5–5.5)
SODIUM BLD-SCNC: 140 MMOL/L (ref 132–146)
T4 FREE: 1.15 NG/DL (ref 0.93–1.7)
TOTAL IRON BINDING CAPACITY: 363 MCG/DL (ref 250–450)
TOTAL PROTEIN: 7.5 G/DL (ref 6.4–8.3)
TSH SERPL DL<=0.05 MIU/L-ACNC: 1.41 UIU/ML (ref 0.27–4.2)
WBC # BLD: 8.2 E9/L (ref 4.5–11.5)

## 2021-06-01 PROCEDURE — 1036F TOBACCO NON-USER: CPT | Performed by: FAMILY MEDICINE

## 2021-06-01 PROCEDURE — G8419 CALC BMI OUT NRM PARAM NOF/U: HCPCS | Performed by: FAMILY MEDICINE

## 2021-06-01 PROCEDURE — 99213 OFFICE O/P EST LOW 20 MIN: CPT | Performed by: FAMILY MEDICINE

## 2021-06-01 PROCEDURE — G8427 DOCREV CUR MEDS BY ELIG CLIN: HCPCS | Performed by: FAMILY MEDICINE

## 2021-06-01 RX ORDER — PRENATAL VIT/IRON FUM/FOLIC AC 27MG-0.8MG
1 TABLET ORAL DAILY
Qty: 30 TABLET | Refills: 5 | Status: SHIPPED | OUTPATIENT
Start: 2021-06-01

## 2021-06-01 ASSESSMENT — PATIENT HEALTH QUESTIONNAIRE - PHQ9
SUM OF ALL RESPONSES TO PHQ QUESTIONS 1-9: 0
2. FEELING DOWN, DEPRESSED OR HOPELESS: 0
SUM OF ALL RESPONSES TO PHQ9 QUESTIONS 1 & 2: 0
1. LITTLE INTEREST OR PLEASURE IN DOING THINGS: 0
SUM OF ALL RESPONSES TO PHQ QUESTIONS 1-9: 0
SUM OF ALL RESPONSES TO PHQ QUESTIONS 1-9: 0

## 2021-06-02 ASSESSMENT — ENCOUNTER SYMPTOMS
EYE REDNESS: 0
EYE PAIN: 0
COUGH: 0
CHEST TIGHTNESS: 0
ABDOMINAL PAIN: 0
COLOR CHANGE: 0
EYE DISCHARGE: 0
SINUS PRESSURE: 0
BLOOD IN STOOL: 0
FACIAL SWELLING: 0
STRIDOR: 0
SORE THROAT: 0
ABDOMINAL DISTENTION: 0
ANAL BLEEDING: 0
EYE ITCHING: 0
NAUSEA: 0
CONSTIPATION: 0
CHOKING: 0
RECTAL PAIN: 0
BACK PAIN: 0
WHEEZING: 0
PHOTOPHOBIA: 0
RHINORRHEA: 0
SHORTNESS OF BREATH: 0
DIARRHEA: 0
TROUBLE SWALLOWING: 0
APNEA: 0
VOMITING: 0
VOICE CHANGE: 0

## 2021-06-02 NOTE — PROGRESS NOTES
Vincent Moulton is a 31 y.o. female  . Subjective:      Recently tested positive for pregnancy. Has appontment with gynecology. We will start on prenatal vitamin and perform yearly blood work      Review of Systems   Constitutional: Negative for activity change, appetite change, chills, diaphoresis, fatigue, fever and unexpected weight change. HENT: Negative for congestion, dental problem, drooling, ear discharge, ear pain, facial swelling, hearing loss, mouth sores, nosebleeds, postnasal drip, rhinorrhea, sinus pressure, sneezing, sore throat, tinnitus, trouble swallowing and voice change. Eyes: Negative for photophobia, pain, discharge, redness, itching and visual disturbance. Respiratory: Negative for apnea, cough, choking, chest tightness, shortness of breath, wheezing and stridor. Cardiovascular: Negative for chest pain, palpitations and leg swelling. Gastrointestinal: Negative for abdominal distention, abdominal pain, anal bleeding, blood in stool, constipation, diarrhea, nausea, rectal pain and vomiting. Endocrine: Negative for cold intolerance, heat intolerance, polydipsia, polyphagia and polyuria. Genitourinary: Negative for decreased urine volume, difficulty urinating, dyspareunia, dysuria, enuresis, flank pain, frequency, genital sores, hematuria, menstrual problem, pelvic pain, urgency, vaginal bleeding, vaginal discharge and vaginal pain. Pregnant    Musculoskeletal: Negative for arthralgias, back pain, gait problem, joint swelling, myalgias, neck pain and neck stiffness. Skin: Negative for color change, pallor, rash and wound. Allergic/Immunologic: Negative for environmental allergies, food allergies and immunocompromised state. Neurological: Negative for dizziness, tremors, seizures, syncope, facial asymmetry, speech difficulty, weakness, light-headedness, numbness and headaches. Hematological: Negative for adenopathy. Does not bruise/bleed easily. Psychiatric/Behavioral: Negative for agitation, behavioral problems, confusion, decreased concentration, dysphoric mood, hallucinations, self-injury, sleep disturbance and suicidal ideas. The patient is not nervous/anxious and is not hyperactive. Past Medical History:   Diagnosis Date    Complication of anesthesia     hx of waking during anesthesia       Social History     Socioeconomic History    Marital status: Single     Spouse name: Not on file    Number of children: Not on file    Years of education: Not on file    Highest education level: Not on file   Occupational History    Not on file   Tobacco Use    Smoking status: Former Smoker     Packs/day: 0.50     Types: Cigarettes    Smokeless tobacco: Never Used    Tobacco comment: quit 6 years ago   Vaping Use    Vaping Use: Never used   Substance and Sexual Activity    Alcohol use: Not Currently     Comment: occ    Drug use: No    Sexual activity: Yes   Other Topics Concern    Not on file   Social History Narrative    Not on file     Social Determinants of Health     Financial Resource Strain:     Difficulty of Paying Living Expenses:    Food Insecurity:     Worried About Running Out of Food in the Last Year:     Ran Out of Food in the Last Year:    Transportation Needs:     Lack of Transportation (Medical):      Lack of Transportation (Non-Medical):    Physical Activity:     Days of Exercise per Week:     Minutes of Exercise per Session:    Stress:     Feeling of Stress :    Social Connections:     Frequency of Communication with Friends and Family:     Frequency of Social Gatherings with Friends and Family:     Attends [employer] Services:     Active Member of Clubs or Organizations:     Attends Club or Organization Meetings:     Marital Status:    Intimate Partner Violence:     Fear of Current or Ex-Partner:     Emotionally Abused:     Physically Abused:     Sexually Abused:        Family History   Problem Relation Age of Onset    Thyroid Disease Mother     No Known Problems Father     Stroke Maternal Grandfather     Diabetes Maternal Grandfather        Current Outpatient Medications on File Prior to Visit   Medication Sig Dispense Refill    diphenhydrAMINE (BENADRYL) 25 MG tablet Take 25 mg by mouth nightly as needed for Itching      Prenatal MV-Min-Fe Fum-FA-DHA (PRENATAL 1 PO) Take by mouth       No current facility-administered medications on file prior to visit. No Known Allergies    I have reviewedher allergies, medications, problem list, medical, social and family history and have updated as needed in the electronic medical record. Objective:     Physical Exam  Vitals and nursing note reviewed. Constitutional:       General: She is not in acute distress. Appearance: She is well-developed. She is not diaphoretic. HENT:      Head: Normocephalic and atraumatic. Right Ear: External ear normal.      Left Ear: External ear normal.      Nose: Nose normal.      Mouth/Throat:      Pharynx: No oropharyngeal exudate. Eyes:      General: No scleral icterus. Right eye: No discharge. Left eye: No discharge. Conjunctiva/sclera: Conjunctivae normal.      Pupils: Pupils are equal, round, and reactive to light. Neck:      Thyroid: No thyromegaly. Vascular: No JVD. Trachea: No tracheal deviation. Cardiovascular:      Rate and Rhythm: Normal rate and regular rhythm. Heart sounds: Normal heart sounds. No murmur heard. No friction rub. No gallop. Pulmonary:      Effort: Pulmonary effort is normal. No respiratory distress. Breath sounds: Normal breath sounds. No stridor. No wheezing or rales. Chest:      Chest wall: No tenderness. Abdominal:      General: Bowel sounds are normal. There is no distension. Palpations: Abdomen is soft. There is no mass. Tenderness: There is no abdominal tenderness. There is no guarding or rebound.    Genitourinary: Comments: Will follow with own gynecologist for gynecological and breast care. Musculoskeletal:         General: No tenderness. Normal range of motion. Cervical back: Normal range of motion and neck supple. Lymphadenopathy:      Cervical: No cervical adenopathy. Skin:     General: Skin is warm and dry. Coloration: Skin is not pale. Findings: No erythema or rash. Neurological:      Mental Status: She is alert and oriented to person, place, and time. Cranial Nerves: No cranial nerve deficit. Motor: No abnormal muscle tone. Coordination: Coordination normal.      Deep Tendon Reflexes: Reflexes are normal and symmetric. Reflexes normal.   Psychiatric:         Behavior: Behavior normal.         Thought Content: Thought content normal.         Judgment: Judgment normal.         Assessment / Plan:   Vikki Jay was seen today for annual exam.    Diagnoses and all orders for this visit:    Less than 8 weeks gestation of pregnancy  -     CBC Auto Differential; Future  -     Comprehensive Metabolic Panel; Future  -     Hemoglobin A1C; Future  -     TSH without Reflex; Future  -     T4, Free; Future  -     Vitamin B12 & Folate; Future  -     Prenatal Vit-Fe Fumarate-FA (PRENATAL VITAMIN) 27-0.8 MG TABS; Take 1 tablet by mouth daily  -     Iron and TIBC; Future    Chronic fatigue  -     CBC Auto Differential; Future  -     Comprehensive Metabolic Panel; Future  -     Hemoglobin A1C; Future  -     TSH without Reflex; Future  -     T4, Free; Future  -     Vitamin B12 & Folate; Future  -     Iron and TIBC; Future        Willfollow with own gynecologist for gynecological and breast care. Reviewed health maintenance report. Patient is aware of deficiencies and suggested preventative tests.

## 2021-06-03 LAB
FOLATE: 16.9 NG/ML (ref 4.8–24.2)
VITAMIN B-12: 636 PG/ML (ref 211–946)

## 2021-11-09 DIAGNOSIS — Z02.0 SCHOOL PHYSICAL EXAM: Primary | ICD-10-CM

## 2021-12-31 ENCOUNTER — HOSPITAL ENCOUNTER (OUTPATIENT)
Age: 26
Discharge: HOME | End: 2021-12-31
Payer: COMMERCIAL

## 2021-12-31 DIAGNOSIS — Z02.0 SCHOOL PHYSICAL EXAM: ICD-10-CM

## 2021-12-31 PROCEDURE — 36415 COLL VENOUS BLD VENIPUNCTURE: CPT

## 2021-12-31 PROCEDURE — 86787 VARICELLA-ZOSTER ANTIBODY: CPT

## 2021-12-31 PROCEDURE — 86706 HEP B SURFACE ANTIBODY: CPT

## 2022-01-03 LAB
HBV SURFACE AB TITR SER: NORMAL {TITER}
VARICELLA-ZOSTER VIRUS AB, IGG: NORMAL

## 2022-01-25 ENCOUNTER — HOSPITAL ENCOUNTER (INPATIENT)
Age: 27
LOS: 1 days | Discharge: HOME | DRG: 560 | End: 2022-01-26
Attending: OBSTETRICS & GYNECOLOGY | Admitting: OBSTETRICS & GYNECOLOGY
Payer: COMMERCIAL

## 2022-01-25 ENCOUNTER — ANESTHESIA (OUTPATIENT)
Dept: LABOR AND DELIVERY | Age: 27
DRG: 560 | End: 2022-01-25
Payer: COMMERCIAL

## 2022-01-25 ENCOUNTER — ANESTHESIA EVENT (OUTPATIENT)
Dept: LABOR AND DELIVERY | Age: 27
DRG: 560 | End: 2022-01-25
Payer: COMMERCIAL

## 2022-01-25 ENCOUNTER — APPOINTMENT (OUTPATIENT)
Dept: LABOR AND DELIVERY | Age: 27
DRG: 560 | End: 2022-01-25
Payer: COMMERCIAL

## 2022-01-25 PROBLEM — Z34.90 TERM PREGNANCY: Status: ACTIVE | Noted: 2022-01-25

## 2022-01-25 LAB
ABO/RH: NORMAL
ALBUMIN SERPL-MCNC: 3.5 G/DL (ref 3.5–5.2)
ALP BLD-CCNC: 168 U/L (ref 35–104)
ALT SERPL-CCNC: 12 U/L (ref 0–32)
AMPHETAMINE SCREEN, URINE: NOT DETECTED
ANION GAP SERPL CALCULATED.3IONS-SCNC: 15 MMOL/L (ref 7–16)
ANTIBODY SCREEN: NORMAL
AST SERPL-CCNC: 17 U/L (ref 0–31)
BARBITURATE SCREEN URINE: NOT DETECTED
BENZODIAZEPINE SCREEN, URINE: NOT DETECTED
BILIRUB SERPL-MCNC: 0.3 MG/DL (ref 0–1.2)
BUN BLDV-MCNC: 9 MG/DL (ref 6–20)
CALCIUM SERPL-MCNC: 9.5 MG/DL (ref 8.6–10.2)
CANNABINOID SCREEN URINE: NOT DETECTED
CHLORIDE BLD-SCNC: 99 MMOL/L (ref 98–107)
CO2: 18 MMOL/L (ref 22–29)
COCAINE METABOLITE SCREEN URINE: NOT DETECTED
CREAT SERPL-MCNC: 0.7 MG/DL (ref 0.5–1)
FENTANYL SCREEN, URINE: NOT DETECTED
GFR AFRICAN AMERICAN: >60
GFR NON-AFRICAN AMERICAN: >60 ML/MIN/1.73
GLUCOSE BLD-MCNC: 139 MG/DL (ref 74–99)
HCT VFR BLD CALC: 35.4 % (ref 34–48)
HEMOGLOBIN: 11.5 G/DL (ref 11.5–15.5)
Lab: NORMAL
MCH RBC QN AUTO: 26.8 PG (ref 26–35)
MCHC RBC AUTO-ENTMCNC: 32.5 % (ref 32–34.5)
MCV RBC AUTO: 82.5 FL (ref 80–99.9)
METHADONE SCREEN, URINE: NOT DETECTED
OPIATE SCREEN URINE: NOT DETECTED
OXYCODONE URINE: NOT DETECTED
PDW BLD-RTO: 15.2 FL (ref 11.5–15)
PHENCYCLIDINE SCREEN URINE: NOT DETECTED
PLATELET # BLD: 111 E9/L (ref 130–450)
PMV BLD AUTO: ABNORMAL FL (ref 7–12)
POTASSIUM SERPL-SCNC: 3.7 MMOL/L (ref 3.5–5)
RBC # BLD: 4.29 E12/L (ref 3.5–5.5)
SODIUM BLD-SCNC: 132 MMOL/L (ref 132–146)
TOTAL PROTEIN: 6.8 G/DL (ref 6.4–8.3)
WBC # BLD: 7.5 E9/L (ref 4.5–11.5)

## 2022-01-25 PROCEDURE — 2500000003 HC RX 250 WO HCPCS: Performed by: ANESTHESIOLOGY

## 2022-01-25 PROCEDURE — 99024 POSTOP FOLLOW-UP VISIT: CPT | Performed by: ADVANCED PRACTICE MIDWIFE

## 2022-01-25 PROCEDURE — 86901 BLOOD TYPING SEROLOGIC RH(D): CPT

## 2022-01-25 PROCEDURE — 6370000000 HC RX 637 (ALT 250 FOR IP): Performed by: OBSTETRICS & GYNECOLOGY

## 2022-01-25 PROCEDURE — 86850 RBC ANTIBODY SCREEN: CPT

## 2022-01-25 PROCEDURE — 6360000002 HC RX W HCPCS: Performed by: ADVANCED PRACTICE MIDWIFE

## 2022-01-25 PROCEDURE — 2580000003 HC RX 258: Performed by: OBSTETRICS & GYNECOLOGY

## 2022-01-25 PROCEDURE — 1220000001 HC SEMI PRIVATE L&D R&B

## 2022-01-25 PROCEDURE — 36415 COLL VENOUS BLD VENIPUNCTURE: CPT

## 2022-01-25 PROCEDURE — 80307 DRUG TEST PRSMV CHEM ANLYZR: CPT

## 2022-01-25 PROCEDURE — 6370000000 HC RX 637 (ALT 250 FOR IP)

## 2022-01-25 PROCEDURE — 3700000025 EPIDURAL BLOCK: Performed by: ANESTHESIOLOGY

## 2022-01-25 PROCEDURE — 85027 COMPLETE CBC AUTOMATED: CPT

## 2022-01-25 PROCEDURE — 80053 COMPREHEN METABOLIC PANEL: CPT

## 2022-01-25 PROCEDURE — 86900 BLOOD TYPING SEROLOGIC ABO: CPT

## 2022-01-25 PROCEDURE — 6360000002 HC RX W HCPCS: Performed by: OBSTETRICS & GYNECOLOGY

## 2022-01-25 PROCEDURE — 7200000001 HC VAGINAL DELIVERY

## 2022-01-25 RX ORDER — SODIUM CHLORIDE 0.9 % (FLUSH) 0.9 %
10 SYRINGE (ML) INJECTION PRN
Status: DISCONTINUED | OUTPATIENT
Start: 2022-01-25 | End: 2022-01-26 | Stop reason: HOSPADM

## 2022-01-25 RX ORDER — SODIUM CHLORIDE, SODIUM LACTATE, POTASSIUM CHLORIDE, AND CALCIUM CHLORIDE .6; .31; .03; .02 G/100ML; G/100ML; G/100ML; G/100ML
1000 INJECTION, SOLUTION INTRAVENOUS PRN
Status: DISCONTINUED | OUTPATIENT
Start: 2022-01-25 | End: 2022-01-25

## 2022-01-25 RX ORDER — EPHEDRINE SULFATE/0.9% NACL/PF 50 MG/5 ML
5 SYRINGE (ML) INTRAVENOUS PRN
Status: DISCONTINUED | OUTPATIENT
Start: 2022-01-25 | End: 2022-01-25

## 2022-01-25 RX ORDER — HYDROCODONE BITARTRATE AND ACETAMINOPHEN 5; 325 MG/1; MG/1
2 TABLET ORAL EVERY 4 HOURS PRN
Status: DISCONTINUED | OUTPATIENT
Start: 2022-01-25 | End: 2022-01-26 | Stop reason: HOSPADM

## 2022-01-25 RX ORDER — SODIUM CHLORIDE 9 MG/ML
25 INJECTION, SOLUTION INTRAVENOUS PRN
Status: DISCONTINUED | OUTPATIENT
Start: 2022-01-25 | End: 2022-01-26 | Stop reason: HOSPADM

## 2022-01-25 RX ORDER — SODIUM CHLORIDE 9 MG/ML
25 INJECTION, SOLUTION INTRAVENOUS PRN
Status: DISCONTINUED | OUTPATIENT
Start: 2022-01-25 | End: 2022-01-25

## 2022-01-25 RX ORDER — IBUPROFEN 800 MG/1
TABLET ORAL
Status: COMPLETED
Start: 2022-01-25 | End: 2022-01-25

## 2022-01-25 RX ORDER — LIDOCAINE HYDROCHLORIDE 10 MG/ML
INJECTION, SOLUTION EPIDURAL; INFILTRATION; INTRACAUDAL; PERINEURAL
Status: DISCONTINUED
Start: 2022-01-25 | End: 2022-01-25

## 2022-01-25 RX ORDER — MODIFIED LANOLIN
OINTMENT (GRAM) TOPICAL PRN
Status: DISCONTINUED | OUTPATIENT
Start: 2022-01-25 | End: 2022-01-26 | Stop reason: HOSPADM

## 2022-01-25 RX ORDER — ONDANSETRON 2 MG/ML
4 INJECTION INTRAMUSCULAR; INTRAVENOUS EVERY 6 HOURS PRN
Status: DISCONTINUED | OUTPATIENT
Start: 2022-01-25 | End: 2022-01-25 | Stop reason: SDUPTHER

## 2022-01-25 RX ORDER — SODIUM CHLORIDE, SODIUM LACTATE, POTASSIUM CHLORIDE, AND CALCIUM CHLORIDE .6; .31; .03; .02 G/100ML; G/100ML; G/100ML; G/100ML
500 INJECTION, SOLUTION INTRAVENOUS PRN
Status: DISCONTINUED | OUTPATIENT
Start: 2022-01-25 | End: 2022-01-25

## 2022-01-25 RX ORDER — SODIUM CHLORIDE 0.9 % (FLUSH) 0.9 %
10 SYRINGE (ML) INJECTION EVERY 12 HOURS SCHEDULED
Status: DISCONTINUED | OUTPATIENT
Start: 2022-01-25 | End: 2022-01-26 | Stop reason: HOSPADM

## 2022-01-25 RX ORDER — SODIUM CHLORIDE 0.9 % (FLUSH) 0.9 %
5-40 SYRINGE (ML) INJECTION EVERY 12 HOURS SCHEDULED
Status: DISCONTINUED | OUTPATIENT
Start: 2022-01-25 | End: 2022-01-25

## 2022-01-25 RX ORDER — SODIUM CHLORIDE, SODIUM LACTATE, POTASSIUM CHLORIDE, CALCIUM CHLORIDE 600; 310; 30; 20 MG/100ML; MG/100ML; MG/100ML; MG/100ML
INJECTION, SOLUTION INTRAVENOUS CONTINUOUS
Status: DISCONTINUED | OUTPATIENT
Start: 2022-01-25 | End: 2022-01-25

## 2022-01-25 RX ORDER — NALOXONE HYDROCHLORIDE 0.4 MG/ML
0.4 INJECTION, SOLUTION INTRAMUSCULAR; INTRAVENOUS; SUBCUTANEOUS PRN
Status: DISCONTINUED | OUTPATIENT
Start: 2022-01-25 | End: 2022-01-25

## 2022-01-25 RX ORDER — DOCUSATE SODIUM 100 MG/1
100 CAPSULE, LIQUID FILLED ORAL 2 TIMES DAILY
Status: DISCONTINUED | OUTPATIENT
Start: 2022-01-25 | End: 2022-01-25

## 2022-01-25 RX ORDER — SODIUM CHLORIDE 0.9 % (FLUSH) 0.9 %
5-40 SYRINGE (ML) INJECTION PRN
Status: DISCONTINUED | OUTPATIENT
Start: 2022-01-25 | End: 2022-01-25

## 2022-01-25 RX ORDER — ONDANSETRON 2 MG/ML
4 INJECTION INTRAMUSCULAR; INTRAVENOUS EVERY 6 HOURS PRN
Status: DISCONTINUED | OUTPATIENT
Start: 2022-01-25 | End: 2022-01-25

## 2022-01-25 RX ORDER — IBUPROFEN 800 MG/1
800 TABLET ORAL EVERY 8 HOURS PRN
Status: DISCONTINUED | OUTPATIENT
Start: 2022-01-25 | End: 2022-01-26 | Stop reason: HOSPADM

## 2022-01-25 RX ORDER — ACETAMINOPHEN 325 MG/1
650 TABLET ORAL EVERY 4 HOURS PRN
Status: DISCONTINUED | OUTPATIENT
Start: 2022-01-25 | End: 2022-01-26 | Stop reason: HOSPADM

## 2022-01-25 RX ORDER — DOCUSATE SODIUM 100 MG/1
100 CAPSULE, LIQUID FILLED ORAL 2 TIMES DAILY
Status: DISCONTINUED | OUTPATIENT
Start: 2022-01-25 | End: 2022-01-26 | Stop reason: HOSPADM

## 2022-01-25 RX ORDER — ACETAMINOPHEN 325 MG/1
650 TABLET ORAL EVERY 6 HOURS PRN
Status: ON HOLD | COMMUNITY
End: 2022-01-26 | Stop reason: HOSPADM

## 2022-01-25 RX ORDER — NALBUPHINE HCL 10 MG/ML
5 AMPUL (ML) INJECTION EVERY 4 HOURS PRN
Status: DISCONTINUED | OUTPATIENT
Start: 2022-01-25 | End: 2022-01-25

## 2022-01-25 RX ORDER — HYDROCODONE BITARTRATE AND ACETAMINOPHEN 5; 325 MG/1; MG/1
1 TABLET ORAL EVERY 4 HOURS PRN
Status: DISCONTINUED | OUTPATIENT
Start: 2022-01-25 | End: 2022-01-26 | Stop reason: HOSPADM

## 2022-01-25 RX ADMIN — Medication 15 ML: at 13:32

## 2022-01-25 RX ADMIN — Medication 2 MILLI-UNITS/MIN: at 09:00

## 2022-01-25 RX ADMIN — BENZOCAINE AND LEVOMENTHOL: 200; 5 SPRAY TOPICAL at 23:22

## 2022-01-25 RX ADMIN — Medication 909 MILLI-UNITS/MIN: at 16:50

## 2022-01-25 RX ADMIN — SODIUM CHLORIDE, POTASSIUM CHLORIDE, SODIUM LACTATE AND CALCIUM CHLORIDE: 600; 310; 30; 20 INJECTION, SOLUTION INTRAVENOUS at 08:30

## 2022-01-25 RX ADMIN — WITCH HAZEL 40 EACH: 500 SOLUTION RECTAL; TOPICAL at 23:23

## 2022-01-25 RX ADMIN — Medication: at 23:23

## 2022-01-25 RX ADMIN — ACETAMINOPHEN 650 MG: 325 TABLET ORAL at 23:23

## 2022-01-25 RX ADMIN — Medication 15 ML/HR: at 13:37

## 2022-01-25 RX ADMIN — IBUPROFEN 800 MG: 800 TABLET, FILM COATED ORAL at 17:06

## 2022-01-25 RX ADMIN — Medication 87.3 MILLI-UNITS/MIN: at 17:01

## 2022-01-25 ASSESSMENT — PAIN SCALES - GENERAL
PAINLEVEL_OUTOF10: 7
PAINLEVEL_OUTOF10: 8

## 2022-01-25 NOTE — H&P
Department of Obstetrics and Gynecology  [facility] History and Physical        CHIEF COMPLAINT: IOL     HISTORY OF PRESENT ILLNESS:    The patient is a 31 y.o. female , Patient's last menstrual period was 2021.,  at 39w3d. OB History        3    Para   2    Term   2            AB        Living   2       SAB        IAB        Ectopic        Molar        Multiple   0    Live Births   2            Patient presents for scheduled IOL    Estimated Due Date: Estimated Date of Delivery: 22    PRENATAL CARE:    Complicated by:   Patient Active Problem List   Diagnosis Code    Family history of VSD (ventricular septal defect) Z82.79    Pilonidal cyst L05.91    Morbidly obese ([facility] [address] E66.01    Term birth of  female Z45.0    Term pregnancy Z34.90       PAST OB HISTORY  OB History        3    Para   2    Term   2            AB        Living   2       SAB        IAB        Ectopic        Molar        Multiple   0    Live Births   2                Past Medical History:        Diagnosis Date    Complication of anesthesia     hx of waking during anesthesia     Past Surgical History:        Procedure Laterality Date    SALIVARY GLAND SURGERY  2011    left submandibulat abscess     Social History:    TOBACCO:   reports that she has quit smoking. Her smoking use included cigarettes. She smoked 0.50 packs per day. She has never used smokeless tobacco.  ETOH:   reports previous alcohol use. DRUGS:   reports no history of drug use.   Family History:       Problem Relation Age of Onset    Thyroid Disease Mother     No Known Problems Father     Stroke Maternal Grandfather     Diabetes Maternal Grandfather      Medications Prior to Admission:  Medications Prior to Admission: acetaminophen (TYLENOL) 325 MG tablet, Take 650 mg by mouth every 6 hours as needed for Pain  Prenatal Vit-Fe Fumarate-FA (PRENATAL VITAMIN) 27-0.8 MG TABS, Take 1 tablet by mouth 2022    AST 17 2022    ALT 12 2022    LABGLOM >60 2022    GFRAA >60 2022       Prenatal labs:  - HIV: negative  - HBsAg: negative  - Hepatits C: negative  - RPR: negative  - Chlamydia: negative  - GC: negative  - HSV: not reported  - Rubella: immune  - GBS: negative  - UDS: PENDING      ASSESSMENT AND PLAN:  IUP 39 3  FHR category 1      Admit   Pitocin IOL     Electronically signed by PRIYANKA Gonsalez CNM on 2022 at 10:09 AM

## 2022-01-25 NOTE — PROGRESS NOTES
Patient admitted to unit for scheduled induction of labor. Pt oriented to room, continuous fetal and contraction monitoring applied. Peripheral IV started and labs drawn. Consents discussed and signed. Plan of care for day discussed with patient and spouse, verbalizes understanding. Pt resting comfortably in bed with call light within reach, pt instructed to call for assistance.

## 2022-01-25 NOTE — ANESTHESIA PROCEDURE NOTES
Epidural Block    Patient location during procedure: OB  Start time: 1/25/2022 1:17 PM  End time: 1/25/2022 1:39 PM  Reason for block: labor epidural  Staffing  Performed: resident/CRNA   Anesthesiologist: Alyssa Gannon MD  Resident/CRNA: PRIYANKA Matute CRNA  Preanesthetic Checklist  Completed: patient identified, IV checked, site marked, risks and benefits discussed, surgical consent, monitors and equipment checked, pre-op evaluation, timeout performed, anesthesia consent given, oxygen available and patient being monitored  Epidural  Patient position: sitting  Prep: ChloraPrep  Patient monitoring: cardiac monitor, continuous pulse ox and frequent blood pressure checks  Approach: midline  Location: lumbar (1-5)  Injection technique: MILY saline  Provider prep: mask and sterile gloves  Needle  Needle type: Tuohy   Needle gauge: 18 G  Needle length: 2.5 in  Needle insertion depth: 6 cm  Catheter type: end hole  Catheter size: 20 G.   Catheter at skin depth: 12 cm  Test dose: negative  Assessment  Hemodynamics: stable  Attempts: 1

## 2022-01-25 NOTE — ANESTHESIA PRE PROCEDURE
Department of Anesthesiology  Preprocedure Note       Name:  Chloe Mujica   Age:  31 y.o.  :  1995                                          MRN:  [id number]         Date:  2022      Surgeon: * No surgeons listed *    Procedure: * No procedures listed *    Medications prior to admission:   Prior to Admission medications    Medication Sig Start Date End Date Taking?  Authorizing Provider   acetaminophen (TYLENOL) 325 MG tablet Take 650 mg by mouth every 6 hours as needed for Pain   Yes Historical Provider, MD   Prenatal Vit-Fe Fumarate-FA (PRENATAL VITAMIN) 27-0.8 MG TABS Take 1 tablet by mouth daily 21  Yes Elvi Munoz DO   diphenhydrAMINE (BENADRYL) 25 MG tablet Take 25 mg by mouth nightly as needed for Itching   Yes Historical Provider, MD   Prenatal MV-Min-Fe Fum-FA-DHA (PRENATAL 1 PO) Take by mouth   Yes Historical Provider, MD       Current medications:    Current Facility-Administered Medications   Medication Dose Route Frequency Provider Last Rate Last Admin    lactated ringers infusion   IntraVENous Continuous Meenakshi Bobo  mL/hr at 22 0830 New Bag at 22 0830    lactated ringers bolus  500 mL IntraVENous PRN Meenakshi Bobo MD        Or    lactated ringers bolus  1,000 mL IntraVENous PRN Meenakshi Bobo MD        sodium chloride flush 0.9 % injection 5-40 mL  5-40 mL IntraVENous 2 times per day Meenakshi Bobo MD        sodium chloride flush 0.9 % injection 5-40 mL  5-40 mL IntraVENous PRN Meenakshi Bobo MD        0.9 % sodium chloride infusion  25 mL IntraVENous PRN Meenakshi Bobo MD        oxytocin (PITOCIN) 30 units in 500 mL infusion  87.3 dayne-units/min IntraVENous Continuous PRN Meenakshi Bobo MD        And    oxytocin (PITOCIN) 30 units in 500 mL infusion  909 dayne-units/min IntraVENous PRN Meenakshi Bobo MD        ondansetron [facility]) injection 4 mg  4 mg IntraVENous Q6H PRN Meenakshi Bobo MD        docusate sodium (COLACE) capsule 100 mg  100 mg Oral BID Andriy Woodruff MD        oxytocin (PITOCIN) 30 units in 500 mL infusion  1 dayne-units/min IntraVENous Continuous Taconite Ambrosia, APRN - CNM 16 mL/hr at 22 1230 16 dayne-units/min at 22 1230    oxytocin (PITOCIN) 30 units in 500 mL infusion  87.3 dayne-units/min IntraVENous Continuous PRN Taconite Ambrosia, APRN - CNM        And    oxytocin (PITOCIN) 30 units in 500 mL infusion  10 Units IntraVENous PRN Teodoro Ambrosia, APRN - CNM        lidocaine PF 1 % injection             fentaNYL 1.85mcg/ml and bupivacaine 0.1% 15ml syringe (Home [facility]) (OB) 15 mL epidural  15 mL Epidural Once Lolis Duncan MD        fentaNYL 1.85mcg/ml and Bupivicaine 0.1% in 0.9% NS 135ml infusion (OB) epidural  15 mL/hr Epidural Continuous Lolis Duncan MD        [facility]) injection 0.4 mg  0.4 mg IntraVENous PRN Lolis Duncan MD        nalbuphine (NUBAIN) injection 5 mg  5 mg IntraVENous Q4H PRN Lolis Duncan MD        ondansetron [facility]) injection 4 mg  4 mg IntraVENous Q6H PRN Lolis Duncan MD        ePHEDrine injection 5 mg  5 mg IntraVENous PRN Lolis Duncan MD           Allergies:  No Known Allergies    Problem List:    Patient Active Problem List   Diagnosis Code    Family history of VSD (ventricular septal defect) Z82.79    Pilonidal cyst L05.91    Morbidly obese ([facility] [address] E66.01    Term birth of  female Z45.0   Munoz Encounter for elective induction of labor Z34.90    Pregnancy with 44 completed weeks gestation Z3A.39       Past Medical History:        Diagnosis Date    Complication of anesthesia     hx of waking during anesthesia       Past Surgical History:        Procedure Laterality Date    SALIVARY GLAND SURGERY  2011    left submandibulat abscess       Social History:    Social History     Tobacco Use    Smoking status: Former Smoker     Packs/day: 0.50     Types: Cigarettes    Smokeless tobacco: Never Used    Tobacco comment: quit 6 years ago   Substance Use Topics    Alcohol use: Not Currently     Comment: occ                                Counseling given: Not Answered  Comment: quit 6 years ago      Vital Signs (Current):   Vitals:    01/25/22 0930 01/25/22 0941 01/25/22 1000 01/25/22 1100   BP: (!) 142/63 134/64 (!) 121/55 (!) 125/59   Pulse: 91 97 98 96   Resp:       Temp:       TempSrc:       SpO2:       Weight:       Height:                                                  BP Readings from Last 3 Encounters:   01/25/22 (!) 125/59   06/01/21 118/80   05/28/21 115/65       NPO Status:                                                                                 BMI:   Wt Readings from Last 3 Encounters:   01/25/22 230 lb (104.3 kg)   06/01/21 227 lb (103 kg)   05/27/21 218 lb (98.9 kg)     Body mass index is 38.27 kg/m². CBC:   Lab Results   Component Value Date    WBC 7.5 01/25/2022    RBC 4.29 01/25/2022    HGB 11.5 01/25/2022    HCT 35.4 01/25/2022    MCV 82.5 01/25/2022    RDW 15.2 01/25/2022     01/25/2022       CMP:   Lab Results   Component Value Date     01/25/2022    K 3.7 01/25/2022    CL 99 01/25/2022    CO2 18 01/25/2022    BUN 9 01/25/2022    CREATININE 0.7 01/25/2022    GFRAA >60 01/25/2022    LABGLOM >60 01/25/2022    GLUCOSE 139 01/25/2022    PROT 6.8 01/25/2022    CALCIUM 9.5 01/25/2022    BILITOT 0.3 01/25/2022    ALKPHOS 168 01/25/2022    AST 17 01/25/2022    ALT 12 01/25/2022       POC Tests: No results for input(s): POCGLU, POCNA, POCK, POCCL, POCBUN, POCHEMO, POCHCT in the last 72 hours.     Coags: No results found for: PROTIME, INR, APTT    HCG (If Applicable):   Lab Results   Component Value Date    PREGSERUM NEGATIVE 12/17/2012        ABGs: No results found for: PHART, PO2ART, LQQ0HKM, HYH3JTS, BEART, V6SGMXHL     Type & Screen (If Applicable):  No results found for: LABABO, LABRH    Drug/Infectious Status (If Applicable):  No results found for: HIV, HEPCAB    COVID-19 Screening (If Applicable): No results found for: COVID19        Anesthesia Evaluation  Patient summary reviewed and Nursing notes reviewed  Airway: Mallampati: II  TM distance: >3 FB     Mouth opening: > = 3 FB Dental: normal exam         Pulmonary:                              Cardiovascular:  Exercise tolerance: good (>4 METS),           Rhythm: regular  Rate: normal           Beta Blocker:  Not on Beta Blocker         Neuro/Psych:               GI/Hepatic/Renal:             Endo/Other:                     Abdominal:             Vascular: Other Findings:             Anesthesia Plan      epidural     ASA 2             Anesthetic plan and risks discussed with patient.         Attending anesthesiologist reviewed and agrees with Preprocedure content              PRIYAKNA Garnett - CRNA   1/25/2022

## 2022-01-25 NOTE — L&D DELIVERY NOTE
VAGINAL DELIVERY NOTE    PRE OP DX:   Term pregnancy at 39 weeks, induction of labor    POST OP DX:  Same plus delivery of live male infant    PROCEDURE:      ANESTHESIA:  Epidural     PLACENTA:  3VC spontaneous intact    MEDS:  None    COMPLICATIONS:  None    NUCHAL CORD:  None    SHOULDER DYSTOCIA:  None    EBL:  < 300 cc    LACERATIONS:  None    SPECIMENS:  None    BABY:  Live male infant 8# Clabe Nurse with clear fluid and Apgars 9/9    Mom and baby to usual care    Josefina Wasserman MD  2022

## 2022-01-25 NOTE — PROGRESS NOTES
Matthew Fallon CRNA in room for procedure at 1318  Patient sitting at side of bed for epidural insertion at 1318  Epidural catheter placed [id number]  Test dose given by Matthew Fallon CRNA at 1329  Epidural bolus given by Matthew Fallon CRNA_ at [phone]  Patient returned to semi-gonzalez's position in bed at 1332  Epidural pump programmed to continuously infuse by Matthew Fallon CRNA at

## 2022-01-26 VITALS
HEART RATE: 102 BPM | OXYGEN SATURATION: 100 % | HEIGHT: 65 IN | TEMPERATURE: 98.1 F | RESPIRATION RATE: 18 BRPM | DIASTOLIC BLOOD PRESSURE: 76 MMHG | BODY MASS INDEX: 38.32 KG/M2 | WEIGHT: 230 LBS | SYSTOLIC BLOOD PRESSURE: 130 MMHG

## 2022-01-26 PROBLEM — Z34.90 ENCOUNTER FOR ELECTIVE INDUCTION OF LABOR: Status: RESOLVED | Noted: 2022-01-25 | Resolved: 2022-01-26

## 2022-01-26 PROCEDURE — 6360000002 HC RX W HCPCS: Performed by: OBSTETRICS & GYNECOLOGY

## 2022-01-26 PROCEDURE — 90715 TDAP VACCINE 7 YRS/> IM: CPT | Performed by: OBSTETRICS & GYNECOLOGY

## 2022-01-26 PROCEDURE — 6370000000 HC RX 637 (ALT 250 FOR IP): Performed by: OBSTETRICS & GYNECOLOGY

## 2022-01-26 PROCEDURE — 90471 IMMUNIZATION ADMIN: CPT | Performed by: OBSTETRICS & GYNECOLOGY

## 2022-01-26 PROCEDURE — 99024 POSTOP FOLLOW-UP VISIT: CPT | Performed by: ADVANCED PRACTICE MIDWIFE

## 2022-01-26 RX ORDER — IBUPROFEN 800 MG/1
800 TABLET ORAL EVERY 8 HOURS PRN
Qty: 30 TABLET | Refills: 0 | Status: SHIPPED | OUTPATIENT
Start: 2022-01-26 | End: 2022-11-02

## 2022-01-26 RX ADMIN — IBUPROFEN 800 MG: 800 TABLET, FILM COATED ORAL at 15:56

## 2022-01-26 RX ADMIN — TETANUS TOXOID, REDUCED DIPHTHERIA TOXOID AND ACELLULAR PERTUSSIS VACCINE, ADSORBED 0.5 ML: 5; 2.5; 8; 8; 2.5 SUSPENSION INTRAMUSCULAR at 19:52

## 2022-01-26 RX ADMIN — HYDROCODONE BITARTRATE AND ACETAMINOPHEN 1 TABLET: 5; 325 TABLET ORAL at 09:47

## 2022-01-26 RX ADMIN — IBUPROFEN 800 MG: 800 TABLET, FILM COATED ORAL at 01:15

## 2022-01-26 RX ADMIN — DOCUSATE SODIUM 100 MG: 100 CAPSULE ORAL at 09:46

## 2022-01-26 RX ADMIN — ACETAMINOPHEN 650 MG: 325 TABLET ORAL at 05:50

## 2022-01-26 ASSESSMENT — PAIN SCALES - GENERAL
PAINLEVEL_OUTOF10: 6
PAINLEVEL_OUTOF10: 7
PAINLEVEL_OUTOF10: 6
PAINLEVEL_OUTOF10: 8

## 2022-01-26 NOTE — ANESTHESIA POSTPROCEDURE EVALUATION
Department of Anesthesiology  Postprocedure Note    Patient: Brooks Tavarez  MRN: [id number]  YOB: 1995  Date of evaluation: 1/25/2022  Time:  8:39 PM     Procedure Summary     Date: 01/25/22 Room / Location:     Anesthesia Start: 7982 Anesthesia Stop: 5622    Procedure: Labor Analgesia Diagnosis:     Scheduled Providers:  Responsible Provider: Gato Chirinos MD    Anesthesia Type: epidural ASA Status: 2          Anesthesia Type: epidural    Karen Phase I:      Karen Phase II:      Last vitals: Reviewed and per EMR flowsheets.        Anesthesia Post Evaluation    Patient location during evaluation: bedside  Patient participation: complete - patient participated  Level of consciousness: awake and alert  Airway patency: patent  Nausea & Vomiting: no nausea and no vomiting  Complications: no  Cardiovascular status: hemodynamically stable  Respiratory status: acceptable  Hydration status: euvolemic  Comments: Patient satisfied with epidural for labor

## 2022-01-26 NOTE — PROGRESS NOTES
Awake and eating breakfast. VSS. Fundus firm with small lochia observed on nuha pad. Voiding QS without difficulty. States most of her discomfort is low back pain since epidural was given. No bruising noted. Slight redness at injection site. Using Kpad to low back intermittently for relief. Requesting pain medication for relief. Plan of care discussed-patient agreeable. Call bell in reach.

## 2022-01-26 NOTE — PLAN OF CARE
Problem: Fluid Volume - Imbalance:  Goal: Absence of imbalanced fluid volume signs and symptoms  Outcome: Met This Shift     Problem: Mood - Altered:  Goal: Mood stable  Outcome: Met This Shift     Problem: Pain - Acute:  Goal: Pain level will decrease  Outcome: Met This Shift

## 2022-01-26 NOTE — PROGRESS NOTES
Patient up to shower, voided. Patient tolerated well. Ambulating independently. Melissa care education and breastfeeding discussed. Patient declines discomfort at this time. Comfort measures provided, safety reviewed.

## 2022-01-26 NOTE — PROGRESS NOTES
Hearing screening results were discussed with parent. Questions answered. Brochure given to parent. Advised to monitor developmental milestones and contact physician for any concerns.    Electronically signed by Tremayne Nascimento on 1/26/2022 at 7:22 AM

## 2022-01-26 NOTE — DISCHARGE SUMMARY
[facility] Obstetrical Discharge Form         Patients Name  Cassandra Leal    Gestational Age:  38w3d    Antepartum complications: none    Date of Delivery:   2022      Type of Delivery:   vaginal, spontaneous    Delivered By:                 Alicja Sarah:       Information for the patient's :  Ja Luis [[id number]]        Anesthesia:    Epidural     Intrapartum complications: None    Feeding method:   breast    Postpartum complications: none    Discharge Meds:       Medication List      START taking these medications    ibuprofen 800 MG tablet  Commonly known as: ADVIL;MOTRIN  Take 1 tablet by mouth every 8 hours as needed for Pain        CONTINUE taking these medications    PRENATAL 1 PO     prenatal vitamin 27-0.8 MG Tabs  Take 1 tablet by mouth daily        STOP taking these medications    acetaminophen 325 MG tablet  Commonly known as: TYLENOL     diphenhydrAMINE 25 MG tablet  Commonly known as: BENADRYL           Where to Get Your Medications      These medications were sent to [address] [phone] - [phone]  [facility], [address] [place] [address]    Phone: [phone]   · ibuprofen 800 MG tablet         Discharge Date:   2022  Condition:    Stable Home    Plan:   Follow up    in 6 week(s)

## 2022-01-26 NOTE — PROGRESS NOTES
Department of Obstetrics and Gynecology  Labor and Delivery  Post Partum Progress Note    Postpartum Day # 1    SUBJECTIVE:  Pt resting in bed, reports feeling well, denies emotional concerns. Infant feeding with out difficulty. Ambulatory per self. Desires DC home.      OBJECTIVE:     Vitals:  /64   Pulse 96   Temp 97.9 °F (36.6 °C) (Oral)   Resp 18   Ht 5' 5\" (1.651 m)   Wt 230 lb (104.3 kg)   LMP 04/24/2021   SpO2 99%   Breastfeeding Unknown   BMI 38.27 kg/m²     LUNGS:  No increased work of breathing, good air exchange, clear to auscultation bilaterally, no crackles or wheezing  CARDIOVASCULAR:  Normal apical impulse, regular rate and rhythm  ABDOMEN:  normal bowel sounds, soft and non tender   GENITAL/URINARY:  External Genitalia:  General appearance; normal, Hair distribution; normal, Lesions absent  Uterus: @ 1 below   Minimal Lochia       CBC:    Lab Results   Component Value Date    WBC 7.5 01/25/2022    RBC 4.29 01/25/2022    HGB 11.5 01/25/2022    HCT 35.4 01/25/2022    MCV 82.5 01/25/2022    RDW 15.2 01/25/2022     01/25/2022       ASSESSMENT & PLAN:   PPD 1  SVB   Breast Feeding     Discharge Pending Infant

## 2022-01-27 NOTE — PROGRESS NOTES
----- Message from Francesca Padilla sent at 5/20/2020  4:18 PM EDT -----  Regarding: Port/MyChart  Appointment Request From: Laura Harris    With Provider: Negra Millan MD [-Primary Care Physician-]    Preferred Date Range: From 5/25/2020 To 5/29/2020    Preferred Times: Monday Morning, Tuesday Morning, Wednesday Morning, Thursday Morning, Friday Morning    Reason: To address the following health maintenance concerns.   Dtap/Tdap/Td Series    Comments:  Regular checkup Discharge instructions and prescription reviewed with patient; discharge form signed by patient and placed in soft chart.

## 2022-01-28 RX ORDER — AMOXICILLIN 875 MG/1
875 TABLET, COATED ORAL 2 TIMES DAILY
Qty: 20 TABLET | Refills: 0 | Status: SHIPPED | OUTPATIENT
Start: 2022-01-28 | End: 2022-02-07

## 2022-11-02 ENCOUNTER — OFFICE VISIT (OUTPATIENT)
Dept: FAMILY MEDICINE CLINIC | Age: 27
End: 2022-11-02
Payer: COMMERCIAL

## 2022-11-02 VITALS
HEART RATE: 79 BPM | BODY MASS INDEX: 37.32 KG/M2 | HEIGHT: 65 IN | DIASTOLIC BLOOD PRESSURE: 80 MMHG | WEIGHT: 224 LBS | SYSTOLIC BLOOD PRESSURE: 118 MMHG | RESPIRATION RATE: 18 BRPM | OXYGEN SATURATION: 99 %

## 2022-11-02 DIAGNOSIS — Z02.1 PRE-EMPLOYMENT EXAMINATION: Primary | ICD-10-CM

## 2022-11-02 DIAGNOSIS — R73.01 IFG (IMPAIRED FASTING GLUCOSE): ICD-10-CM

## 2022-11-02 DIAGNOSIS — Z3A.01 LESS THAN 8 WEEKS GESTATION OF PREGNANCY: ICD-10-CM

## 2022-11-02 DIAGNOSIS — R53.83 FATIGUE, UNSPECIFIED TYPE: ICD-10-CM

## 2022-11-02 DIAGNOSIS — E61.1 IRON DEFICIENCY: ICD-10-CM

## 2022-11-02 DIAGNOSIS — E53.8 VITAMIN B 12 DEFICIENCY: ICD-10-CM

## 2022-11-02 DIAGNOSIS — Z23 NEED FOR INFLUENZA VACCINATION: ICD-10-CM

## 2022-11-02 DIAGNOSIS — E78.2 MIXED HYPERLIPIDEMIA: ICD-10-CM

## 2022-11-02 PROCEDURE — 99214 OFFICE O/P EST MOD 30 MIN: CPT | Performed by: FAMILY MEDICINE

## 2022-11-02 PROCEDURE — 1036F TOBACCO NON-USER: CPT | Performed by: FAMILY MEDICINE

## 2022-11-02 PROCEDURE — G8417 CALC BMI ABV UP PARAM F/U: HCPCS | Performed by: FAMILY MEDICINE

## 2022-11-02 PROCEDURE — G8427 DOCREV CUR MEDS BY ELIG CLIN: HCPCS | Performed by: FAMILY MEDICINE

## 2022-11-02 PROCEDURE — 90471 IMMUNIZATION ADMIN: CPT | Performed by: FAMILY MEDICINE

## 2022-11-02 PROCEDURE — G8482 FLU IMMUNIZE ORDER/ADMIN: HCPCS | Performed by: FAMILY MEDICINE

## 2022-11-02 PROCEDURE — 90674 CCIIV4 VAC NO PRSV 0.5 ML IM: CPT | Performed by: FAMILY MEDICINE

## 2022-11-02 SDOH — ECONOMIC STABILITY: FOOD INSECURITY: WITHIN THE PAST 12 MONTHS, YOU WORRIED THAT YOUR FOOD WOULD RUN OUT BEFORE YOU GOT MONEY TO BUY MORE.: NEVER TRUE

## 2022-11-02 SDOH — ECONOMIC STABILITY: FOOD INSECURITY: WITHIN THE PAST 12 MONTHS, THE FOOD YOU BOUGHT JUST DIDN'T LAST AND YOU DIDN'T HAVE MONEY TO GET MORE.: NEVER TRUE

## 2022-11-02 ASSESSMENT — PATIENT HEALTH QUESTIONNAIRE - PHQ9
SUM OF ALL RESPONSES TO PHQ QUESTIONS 1-9: 0
1. LITTLE INTEREST OR PLEASURE IN DOING THINGS: 0
SUM OF ALL RESPONSES TO PHQ QUESTIONS 1-9: 0
SUM OF ALL RESPONSES TO PHQ QUESTIONS 1-9: 0
2. FEELING DOWN, DEPRESSED OR HOPELESS: 0
SUM OF ALL RESPONSES TO PHQ9 QUESTIONS 1 & 2: 0
SUM OF ALL RESPONSES TO PHQ QUESTIONS 1-9: 0

## 2022-11-02 ASSESSMENT — LIFESTYLE VARIABLES: HOW OFTEN DO YOU HAVE A DRINK CONTAINING ALCOHOL: NEVER

## 2022-11-02 ASSESSMENT — SOCIAL DETERMINANTS OF HEALTH (SDOH): HOW HARD IS IT FOR YOU TO PAY FOR THE VERY BASICS LIKE FOOD, HOUSING, MEDICAL CARE, AND HEATING?: NOT VERY HARD

## 2022-11-02 NOTE — PROGRESS NOTES
Jase Andrews is a 28 y.o. female  . Subjective:      Needs recheck blood work. Needs hep B series for school since did not convert. Needs TB test for school. Discussed case at length. 35 min visit. Review of Systems   Constitutional:  Negative for activity change, appetite change, chills, diaphoresis, fatigue, fever and unexpected weight change. HENT:  Negative for congestion, dental problem, drooling, ear discharge, ear pain, facial swelling, hearing loss, mouth sores, nosebleeds, postnasal drip, rhinorrhea, sinus pressure, sneezing, sore throat, tinnitus, trouble swallowing and voice change. Eyes:  Negative for photophobia, pain, discharge, redness, itching and visual disturbance. Respiratory:  Negative for apnea, cough, choking, chest tightness, shortness of breath, wheezing and stridor. Cardiovascular:  Negative for chest pain, palpitations and leg swelling. Gastrointestinal:  Negative for abdominal distention, abdominal pain, anal bleeding, blood in stool, constipation, diarrhea, nausea, rectal pain and vomiting. Endocrine: Negative for cold intolerance, heat intolerance, polydipsia, polyphagia and polyuria. Genitourinary:  Negative for decreased urine volume, difficulty urinating, dyspareunia, dysuria, enuresis, flank pain, frequency, genital sores, hematuria, menstrual problem, pelvic pain, urgency, vaginal bleeding, vaginal discharge and vaginal pain. Musculoskeletal:  Negative for arthralgias, back pain, gait problem, joint swelling, myalgias, neck pain and neck stiffness. Skin:  Negative for color change, pallor, rash and wound. Allergic/Immunologic: Negative for environmental allergies, food allergies and immunocompromised state. Neurological:  Negative for dizziness, tremors, seizures, syncope, facial asymmetry, speech difficulty, weakness, light-headedness, numbness and headaches. Hematological:  Negative for adenopathy. Does not bruise/bleed easily. Psychiatric/Behavioral:  Negative for agitation, behavioral problems, confusion, decreased concentration, dysphoric mood, hallucinations, self-injury, sleep disturbance and suicidal ideas. The patient is not nervous/anxious and is not hyperactive. Past Medical History:   Diagnosis Date    Complication of anesthesia     hx of waking during anesthesia       Social History     Socioeconomic History    Marital status: Single     Spouse name: Not on file    Number of children: Not on file    Years of education: Not on file    Highest education level: Not on file   Occupational History    Not on file   Tobacco Use    Smoking status: Former     Packs/day: 0.50     Types: Cigarettes    Smokeless tobacco: Never    Tobacco comments:     quit 6 years ago   Vaping Use    Vaping Use: Never used   Substance and Sexual Activity    Alcohol use: Not Currently     Comment: occ    Drug use: No    Sexual activity: Yes   Other Topics Concern    Not on file   Social History Narrative    Not on file     Social Determinants of Health     Financial Resource Strain: Low Risk     Difficulty of Paying Living Expenses: Not very hard   Food Insecurity: No Food Insecurity    Worried About Running Out of Food in the Last Year: Never true    Ran Out of Food in the Last Year: Never true   Transportation Needs: Not on file   Physical Activity: Not on file   Stress: Not on file   Social Connections: Not on file   Intimate Partner Violence: Not on file   Housing Stability: Not on file       Family History   Problem Relation Age of Onset    Thyroid Disease Mother     No Known Problems Father     Stroke Maternal Grandfather     Diabetes Maternal Grandfather        Current Outpatient Medications on File Prior to Visit   Medication Sig Dispense Refill    Prenatal Vit-Fe Fumarate-FA (PRENATAL VITAMIN) 27-0.8 MG TABS Take 1 tablet by mouth daily 30 tablet 5     No current facility-administered medications on file prior to visit.        No Known Allergies    I have reviewedher allergies, medications, problem list, medical, social and family history and have updated as needed in the electronic medical record. Objective:     Physical Exam  Vitals and nursing note reviewed. Constitutional:       General: She is not in acute distress. Appearance: She is well-developed. She is not diaphoretic. HENT:      Head: Normocephalic and atraumatic. Right Ear: External ear normal.      Left Ear: External ear normal.      Nose: Nose normal.      Mouth/Throat:      Pharynx: No oropharyngeal exudate. Eyes:      General: No scleral icterus. Right eye: No discharge. Left eye: No discharge. Conjunctiva/sclera: Conjunctivae normal.      Pupils: Pupils are equal, round, and reactive to light. Neck:      Thyroid: No thyromegaly. Vascular: No JVD. Trachea: No tracheal deviation. Cardiovascular:      Rate and Rhythm: Normal rate and regular rhythm. Heart sounds: Normal heart sounds. No murmur heard. No friction rub. No gallop. Pulmonary:      Effort: Pulmonary effort is normal. No respiratory distress. Breath sounds: Normal breath sounds. No stridor. No wheezing or rales. Chest:      Chest wall: No tenderness. Abdominal:      General: Bowel sounds are normal. There is no distension. Palpations: Abdomen is soft. There is no mass. Tenderness: There is no abdominal tenderness. There is no guarding or rebound. Genitourinary:     Comments: Will follow with own gynecologist for gynecological and breast care. Musculoskeletal:         General: No tenderness. Normal range of motion. Cervical back: Normal range of motion and neck supple. Lymphadenopathy:      Cervical: No cervical adenopathy. Skin:     General: Skin is warm and dry. Coloration: Skin is not pale. Findings: No erythema or rash. Neurological:      Mental Status: She is alert and oriented to person, place, and time. Cranial Nerves: No cranial nerve deficit. Motor: No abnormal muscle tone. Coordination: Coordination normal.      Deep Tendon Reflexes: Reflexes are normal and symmetric. Reflexes normal.   Psychiatric:         Behavior: Behavior normal.         Thought Content: Thought content normal.         Judgment: Judgment normal.       Assessment / Plan:   Christin Diamond was seen today for annual exam.    Diagnoses and all orders for this visit:    Pre-employment examination  -     T-Spot TB Test; Future    Fatigue, unspecified type  -     CBC with Auto Differential; Future  -     Comprehensive Metabolic Panel; Future  -     CBC with Auto Differential; Future  -     Comprehensive Metabolic Panel; Future  -     TSH; Future  -     T4, Free; Future    IFG (impaired fasting glucose)  -     Hemoglobin A1C; Future  -     TSH; Future    Iron deficiency  -     Lipid Panel; Future  -     Iron and TIBC; Future    Mixed hyperlipidemia  -     Lipid Panel; Future    Vitamin B 12 deficiency  -     Vitamin B12 & Folate; Future        Willfollow with own gynecologist for gynecological and breast care. Reviewed health maintenance report. Patient is aware of deficiencies and suggested preventative tests.

## 2022-11-09 ASSESSMENT — ENCOUNTER SYMPTOMS
EYE DISCHARGE: 0
ABDOMINAL PAIN: 0
EYE ITCHING: 0
BACK PAIN: 0
BLOOD IN STOOL: 0
ABDOMINAL DISTENTION: 0
ANAL BLEEDING: 0
COLOR CHANGE: 0
PHOTOPHOBIA: 0
EYE REDNESS: 0
WHEEZING: 0
CHOKING: 0
CHEST TIGHTNESS: 0
SINUS PRESSURE: 0
STRIDOR: 0
NAUSEA: 0
RECTAL PAIN: 0
SHORTNESS OF BREATH: 0
VOICE CHANGE: 0
CONSTIPATION: 0
DIARRHEA: 0
SORE THROAT: 0
VOMITING: 0
TROUBLE SWALLOWING: 0
COUGH: 0
EYE PAIN: 0
RHINORRHEA: 0
FACIAL SWELLING: 0
APNEA: 0

## 2022-12-01 DIAGNOSIS — R53.83 FATIGUE, UNSPECIFIED TYPE: ICD-10-CM

## 2022-12-01 DIAGNOSIS — E78.2 MIXED HYPERLIPIDEMIA: ICD-10-CM

## 2022-12-01 DIAGNOSIS — E53.8 VITAMIN B 12 DEFICIENCY: ICD-10-CM

## 2022-12-01 DIAGNOSIS — E61.1 IRON DEFICIENCY: ICD-10-CM

## 2022-12-01 DIAGNOSIS — R73.01 IFG (IMPAIRED FASTING GLUCOSE): ICD-10-CM

## 2022-12-01 DIAGNOSIS — Z02.1 PRE-EMPLOYMENT EXAMINATION: ICD-10-CM

## 2022-12-01 LAB
ALBUMIN SERPL-MCNC: 4.3 G/DL (ref 3.5–5.2)
ALP BLD-CCNC: 98 U/L (ref 35–104)
ALT SERPL-CCNC: 9 U/L (ref 0–32)
ANION GAP SERPL CALCULATED.3IONS-SCNC: 13 MMOL/L (ref 7–16)
AST SERPL-CCNC: 13 U/L (ref 0–31)
BASOPHILS ABSOLUTE: 0.02 E9/L (ref 0–0.2)
BASOPHILS RELATIVE PERCENT: 0.4 % (ref 0–2)
BILIRUB SERPL-MCNC: 0.3 MG/DL (ref 0–1.2)
BUN BLDV-MCNC: 13 MG/DL (ref 6–20)
CALCIUM SERPL-MCNC: 9.6 MG/DL (ref 8.6–10.2)
CHLORIDE BLD-SCNC: 102 MMOL/L (ref 98–107)
CHOLESTEROL, TOTAL: 163 MG/DL (ref 0–199)
CO2: 25 MMOL/L (ref 22–29)
CREAT SERPL-MCNC: 0.7 MG/DL (ref 0.5–1)
EOSINOPHILS ABSOLUTE: 0.13 E9/L (ref 0.05–0.5)
EOSINOPHILS RELATIVE PERCENT: 2.9 % (ref 0–6)
FOLATE: 15.2 NG/ML (ref 4.8–24.2)
GFR SERPL CREATININE-BSD FRML MDRD: >60 ML/MIN/1.73
GLUCOSE BLD-MCNC: 89 MG/DL (ref 74–99)
HBA1C MFR BLD: 5.1 % (ref 4–5.6)
HCT VFR BLD CALC: 42.3 % (ref 34–48)
HDLC SERPL-MCNC: 44 MG/DL
HEMOGLOBIN: 13.9 G/DL (ref 11.5–15.5)
IMMATURE GRANULOCYTES #: 0.01 E9/L
IMMATURE GRANULOCYTES %: 0.2 % (ref 0–5)
IRON SATURATION: 16 % (ref 15–50)
IRON: 56 MCG/DL (ref 37–145)
LDL CHOLESTEROL CALCULATED: 103 MG/DL (ref 0–99)
LYMPHOCYTES ABSOLUTE: 1.11 E9/L (ref 1.5–4)
LYMPHOCYTES RELATIVE PERCENT: 24.5 % (ref 20–42)
MCH RBC QN AUTO: 29.4 PG (ref 26–35)
MCHC RBC AUTO-ENTMCNC: 32.9 % (ref 32–34.5)
MCV RBC AUTO: 89.4 FL (ref 80–99.9)
MONOCYTES ABSOLUTE: 0.51 E9/L (ref 0.1–0.95)
MONOCYTES RELATIVE PERCENT: 11.3 % (ref 2–12)
NEUTROPHILS ABSOLUTE: 2.75 E9/L (ref 1.8–7.3)
NEUTROPHILS RELATIVE PERCENT: 60.7 % (ref 43–80)
PDW BLD-RTO: 13.1 FL (ref 11.5–15)
PLATELET # BLD: 187 E9/L (ref 130–450)
PMV BLD AUTO: 11.4 FL (ref 7–12)
POTASSIUM SERPL-SCNC: 4.4 MMOL/L (ref 3.5–5)
RBC # BLD: 4.73 E12/L (ref 3.5–5.5)
SODIUM BLD-SCNC: 140 MMOL/L (ref 132–146)
T4 FREE: 0.99 NG/DL (ref 0.93–1.7)
TOTAL IRON BINDING CAPACITY: 347 MCG/DL (ref 250–450)
TOTAL PROTEIN: 7.3 G/DL (ref 6.4–8.3)
TRIGL SERPL-MCNC: 81 MG/DL (ref 0–149)
TSH SERPL DL<=0.05 MIU/L-ACNC: 2.74 UIU/ML (ref 0.27–4.2)
VITAMIN B-12: 414 PG/ML (ref 211–946)
VLDLC SERPL CALC-MCNC: 16 MG/DL
WBC # BLD: 4.5 E9/L (ref 4.5–11.5)

## 2022-12-06 LAB
COMMENT: NORMAL
REPORT: NORMAL

## 2022-12-29 DIAGNOSIS — Z23 NEED FOR HEPATITIS B BOOSTER VACCINATION: Primary | ICD-10-CM

## 2023-05-25 NOTE — H&P
Department of Obstetrics and Gynecology  Attending Obstetrics History and Physical        CHIEF COMPLAINT:  Contractions     HISTORY OF PRESENT ILLNESS:      The patient is a 21 y.o. female , No LMP recorded. ,  at 39w4d. OB History as of 3/27/2020        2    Para   2    Term   2            AB        Living   2       SAB        TAB        Ectopic        Molar        Multiple   0    Live Births   2            Patient presents with a chief complaint as above and is being admitted for labor    Estimated Due Date: Estimated Date of Delivery: 3/29/20    PRENATAL CARE:    Complicated by:   Patient Active Problem List   Diagnosis Code    Family history of VSD (ventricular septal defect) Z82.79    Pilonidal cyst L05.91    Morbidly obese ([facility] [address] E66.01    Term birth of  female Z37.0       PAST OB HISTORY  OB History as of 3/27/2020        2    Para   2    Term   2            AB        Living   2       SAB        TAB        Ectopic        Molar        Multiple   0    Live Births   2                Past Medical History:        Diagnosis Date    Complication of anesthesia     hx of waking during anesthesia     Past Surgical History:        Procedure Laterality Date    SALIVARY GLAND SURGERY  2011    left submandibulat abscess     Social History:    TOBACCO:   reports that she has quit smoking. Her smoking use included cigarettes. She smoked 0.50 packs per day. She has never used smokeless tobacco.  ETOH:   reports previous alcohol use. DRUGS:   reports no history of drug use. Family History:       Problem Relation Age of Onset    Thyroid Disease Mother     No Known Problems Father     Stroke Maternal Grandfather     Diabetes Maternal Grandfather      Medications Prior to Admission:  No medications prior to admission. Allergies:  Patient has no known allergies.     Review of Systems:   Ears, nose, mouth, throat, and face: negative  Respiratory: Subjective   Patient ID: Edwin Castillo is a 41 y.o. male who presents for Follow-up (6-8 week B/P).    HPI   Patient today for follow-up on his hypertension and review of recent lab work overall feeling okay.  States he is tolerating lisinopril without any issues.  Review of Systems   Constitutional:  Negative for chills and fever.   HENT:  Negative for congestion and ear pain.    Eyes:  Negative for visual disturbance.   Respiratory:  Negative for chest tightness and shortness of breath.    Cardiovascular:  Negative for chest pain and palpitations.   Gastrointestinal:  Negative for abdominal pain.   Musculoskeletal:  Negative for arthralgias.   Skin:  Negative for pallor.   Psychiatric/Behavioral:  Negative for confusion.        Objective   /84   Pulse 91   Temp 36.2 °C (97.2 °F)   Resp 14   Wt 82.6 kg (182 lb)   SpO2 97%   BMI 26.49 kg/m²     Physical Exam  Vitals and nursing note reviewed.   Constitutional:       General: He is not in acute distress.     Appearance: Normal appearance. He is not ill-appearing.   HENT:      Head: Normocephalic and atraumatic.      Right Ear: Tympanic membrane, ear canal and external ear normal.      Left Ear: Tympanic membrane, ear canal and external ear normal.      Mouth/Throat:      Pharynx: Oropharynx is clear.   Eyes:      Extraocular Movements: Extraocular movements intact.   Cardiovascular:      Rate and Rhythm: Normal rate and regular rhythm.      Pulses: Normal pulses.      Heart sounds: Normal heart sounds.   Pulmonary:      Effort: Pulmonary effort is normal.      Breath sounds: Normal breath sounds.   Abdominal:      General: Abdomen is flat. Bowel sounds are normal.      Palpations: Abdomen is soft.      Tenderness: There is no abdominal tenderness.   Musculoskeletal:         General: Normal range of motion.      Cervical back: Neck supple.   Skin:     General: Skin is warm.   Neurological:      Mental Status: He is alert and oriented to person, place,  44 0.44 - 3.98 mIU/L   Vitamin D, Total    Collection Time: 05/02/23  9:00 AM   Result Value Ref Range    Vitamin D, 25-Hydroxy 43 ng/mL   CBC    Collection Time: 05/02/23  9:00 AM   Result Value Ref Range    WBC 4.4 4.4 - 11.3 x10E9/L    RBC 5.06 4.50 - 5.90 x10E12/L    Hemoglobin 16.5 13.5 - 17.5 g/dL    Hematocrit 47.5 41.0 - 52.0 %    MCV 94 80 - 100 fL    MCHC 34.7 32.0 - 36.0 g/dL    Platelets 270 150 - 450 x10E9/L    RDW 12.0 11.5 - 14.5 %   Comprehensive Metabolic Panel    Collection Time: 05/02/23  9:00 AM   Result Value Ref Range    Glucose 81 74 - 99 mg/dL    Sodium 136 136 - 145 mmol/L    Potassium 4.3 3.5 - 5.3 mmol/L    Chloride 100 98 - 107 mmol/L    Bicarbonate 30 21 - 32 mmol/L    Anion Gap 10 10 - 20 mmol/L    Urea Nitrogen 16 6 - 23 mg/dL    Creatinine 1.00 0.50 - 1.30 mg/dL    GFR MALE >90 >90 mL/min/1.73m2    Calcium 9.9 8.6 - 10.3 mg/dL    Albumin 4.6 3.4 - 5.0 g/dL    Alkaline Phosphatase 38 33 - 120 U/L    Total Protein 7.8 6.4 - 8.2 g/dL    AST 22 9 - 39 U/L    Total Bilirubin 0.9 0.0 - 1.2 mg/dL    ALT (SGPT) 26 10 - 52 U/L   Lipid Panel    Collection Time: 05/02/23  9:00 AM   Result Value Ref Range    Cholesterol 229 (H) 0 - 199 mg/dL    HDL 63.5 mg/dL    Cholesterol/HDL Ratio 3.6      (H) 0 - 99 mg/dL    VLDL 25 0 - 40 mg/dL    Triglycerides 124 0 - 149 mg/dL     Recent labs reviewed  Refill lisinopril for 90-day supply locally  Return to office 4 months with repeat fasting labs    Assessment/Plan   Problem List Items Addressed This Visit       Essential hypertension - Primary     Nice improvement in blood pressure continue lisinopril 10 mg daily prescription for 90-day supply sent to pharmacy.         Relevant Orders     negative  Cardiovascular: negative  Gastrointestinal: negative  Genitourinary:negative  Integument/breast: negative  Hematologic/lymphatic: negative  Musculoskeletal:negative  Neurological: negative  Behavioral/Psych: negative  Endocrine: negative  Allergic/Immunologic: negative  Psychosocial: negative    PHYSICAL EXAM:    General appearance:  awake, alert, cooperative, no apparent distress, and appears stated age  Neurologic:  Awake, alert, oriented to name, place and time. Cranial nerves II-XII are grossly intact. Motor is 5 out of 5 bilaterally. Cerebellar finger to nose, heel to shin intact. Sensory is intact.   Babinski down going, Romberg negative, and gait is normal.  Lungs:  No increased work of breathing, good air exchange, clear to auscultation bilaterally, no crackles or wheezing  Heart:  Normal apical impulse, regular rate and rhythm, normal S1 and S2, no S3 or S4, and no murmur noted  Abdomen:  No scars, normal bowel sounds, soft, non-distended, non-tender, no masses palpated, no hepatosplenomegally  Fetal heart rate:  Baseline Heart Rate 140, accelerations:  present  Pelvis:  External Genitalia: General appearance; normal, Hair distribution; normal, Lesions absent      BP (!) 143/89   Pulse 71   Temp 98 °F (36.7 °C) (Oral)   Resp 18   Ht 5' 6\" (1.676 m)   Wt 250 lb (113.4 kg)   Breastfeeding Unknown   BMI 40.35 kg/m²     General Labs:    CBC:   Lab Results   Component Value Date    WBC 8.3 03/25/2020    RBC 4.12 03/25/2020    HGB 10.7 03/27/2020    HCT 34.1 03/27/2020    MCV 82.8 03/25/2020    RDW 14.9 03/25/2020     03/25/2020     CMP:    Lab Results   Component Value Date     03/25/2020    K 4.3 03/25/2020     03/25/2020    CO2 20 03/25/2020    BUN 10 03/25/2020    PROT 7.9 08/03/2018     U/A:  No components found for: Burnice Pipes, USPGRAV, UPH, UPROTEIN, UGLUCOSE, UKETONE, UBILI, UBLOOD, UNITRITE, UUROBIL, ULEUKEST, USQEPI, Gary, UWBC, Mally, Synchari, Comprehensive Metabolic Panel    Lipid Panel    Follow Up In Primary Care    Mixed hyperlipidemia     Total cholesterol and LDL are elevated we discussed lifestyle modifications reviewed low-fat low-cholesterol diet.  Recheck again in about 4 months         Relevant Orders    Comprehensive Metabolic Panel    Lipid Panel    Follow Up In Primary Care

## 2023-07-24 ENCOUNTER — TELEPHONE (OUTPATIENT)
Dept: FAMILY MEDICINE CLINIC | Age: 28
End: 2023-07-24

## 2023-07-24 NOTE — TELEPHONE ENCOUNTER
Per Dr Hiro Gabriel: Not sure. She will have to see what corporate care type of place they accept and which one is covered by her insurance. Pt informed.

## 2023-07-24 NOTE — TELEPHONE ENCOUNTER
----- Message from Constantine Wright sent at 7/24/2023  2:58 PM EDT -----  Subject: Message to Provider    QUESTIONS  Information for Provider? patient would like to know where she can go to   get drug testing for school  ---------------------------------------------------------------------------  --------------  [address]  [phone]; OK to leave message on voicemail  ---------------------------------------------------------------------------  --------------  SCRIPT ANSWERS  Relationship to Patient?  Self

## 2023-10-30 ENCOUNTER — OFFICE VISIT (OUTPATIENT)
Dept: FAMILY MEDICINE CLINIC | Age: 28
End: 2023-10-30
Payer: COMMERCIAL

## 2023-10-30 VITALS
DIASTOLIC BLOOD PRESSURE: 80 MMHG | RESPIRATION RATE: 18 BRPM | BODY MASS INDEX: 40.15 KG/M2 | HEART RATE: 91 BPM | WEIGHT: 241 LBS | HEIGHT: 65 IN | SYSTOLIC BLOOD PRESSURE: 128 MMHG | OXYGEN SATURATION: 98 %

## 2023-10-30 DIAGNOSIS — R73.01 IFG (IMPAIRED FASTING GLUCOSE): ICD-10-CM

## 2023-10-30 DIAGNOSIS — E88.819 INSULIN RESISTANCE: ICD-10-CM

## 2023-10-30 DIAGNOSIS — E78.2 MIXED HYPERLIPIDEMIA: ICD-10-CM

## 2023-10-30 DIAGNOSIS — R53.83 FATIGUE, UNSPECIFIED TYPE: ICD-10-CM

## 2023-10-30 DIAGNOSIS — E66.01 MORBIDLY OBESE (HCC): Primary | ICD-10-CM

## 2023-10-30 DIAGNOSIS — E53.8 VITAMIN B 12 DEFICIENCY: ICD-10-CM

## 2023-10-30 PROCEDURE — G8484 FLU IMMUNIZE NO ADMIN: HCPCS | Performed by: FAMILY MEDICINE

## 2023-10-30 PROCEDURE — 99214 OFFICE O/P EST MOD 30 MIN: CPT | Performed by: FAMILY MEDICINE

## 2023-10-30 PROCEDURE — 1036F TOBACCO NON-USER: CPT | Performed by: FAMILY MEDICINE

## 2023-10-30 PROCEDURE — G8417 CALC BMI ABV UP PARAM F/U: HCPCS | Performed by: FAMILY MEDICINE

## 2023-10-30 PROCEDURE — G8427 DOCREV CUR MEDS BY ELIG CLIN: HCPCS | Performed by: FAMILY MEDICINE

## 2023-10-30 RX ORDER — PHENTERMINE HYDROCHLORIDE 37.5 MG/1
37.5 TABLET ORAL
Qty: 30 TABLET | Refills: 0 | Status: SHIPPED | OUTPATIENT
Start: 2023-10-30 | End: 2023-11-29

## 2023-10-30 SDOH — ECONOMIC STABILITY: FOOD INSECURITY: WITHIN THE PAST 12 MONTHS, YOU WORRIED THAT YOUR FOOD WOULD RUN OUT BEFORE YOU GOT MONEY TO BUY MORE.: PATIENT DECLINED

## 2023-10-30 SDOH — ECONOMIC STABILITY: INCOME INSECURITY: HOW HARD IS IT FOR YOU TO PAY FOR THE VERY BASICS LIKE FOOD, HOUSING, MEDICAL CARE, AND HEATING?: PATIENT DECLINED

## 2023-10-30 SDOH — ECONOMIC STABILITY: FOOD INSECURITY: WITHIN THE PAST 12 MONTHS, THE FOOD YOU BOUGHT JUST DIDN'T LAST AND YOU DIDN'T HAVE MONEY TO GET MORE.: PATIENT DECLINED

## 2023-10-30 SDOH — ECONOMIC STABILITY: HOUSING INSECURITY
IN THE LAST 12 MONTHS, WAS THERE A TIME WHEN YOU DID NOT HAVE A STEADY PLACE TO SLEEP OR SLEPT IN A SHELTER (INCLUDING NOW)?: PATIENT REFUSED

## 2023-10-30 ASSESSMENT — PATIENT HEALTH QUESTIONNAIRE - PHQ9
2. FEELING DOWN, DEPRESSED OR HOPELESS: SEVERAL DAYS
SUM OF ALL RESPONSES TO PHQ QUESTIONS 1-9: 1
SUM OF ALL RESPONSES TO PHQ9 QUESTIONS 1 & 2: 1
2. FEELING DOWN, DEPRESSED OR HOPELESS: 1
SUM OF ALL RESPONSES TO PHQ QUESTIONS 1-9: 1
SUM OF ALL RESPONSES TO PHQ9 QUESTIONS 1 & 2: 1
1. LITTLE INTEREST OR PLEASURE IN DOING THINGS: NOT AT ALL
1. LITTLE INTEREST OR PLEASURE IN DOING THINGS: 0

## 2023-10-30 NOTE — PROGRESS NOTES
Ingrid Lopez is a 33 y.o. female  . Subjective:      Discussed weight gain. Has been watching diet. Has been exercising. We discussed phentermine for short term and insulin resistance. Will have blood work done. Patient to watch for side effects including palpitations and insomnia. We will bring back in a month to see where her weight is. Review of Systems   Constitutional:  Positive for unexpected weight change. Negative for activity change, appetite change, chills, diaphoresis, fatigue and fever. HENT:  Negative for congestion, dental problem, drooling, ear discharge, ear pain, facial swelling, hearing loss, mouth sores, nosebleeds, postnasal drip, rhinorrhea, sinus pressure, sneezing, sore throat, tinnitus, trouble swallowing and voice change. Eyes:  Negative for photophobia, pain, discharge, redness, itching and visual disturbance. Respiratory:  Negative for apnea, cough, choking, chest tightness, shortness of breath, wheezing and stridor. Cardiovascular:  Negative for chest pain, palpitations and leg swelling. Gastrointestinal:  Negative for abdominal distention, abdominal pain, anal bleeding, blood in stool, constipation, diarrhea, nausea, rectal pain and vomiting. Endocrine: Negative for cold intolerance, heat intolerance, polydipsia, polyphagia and polyuria. Genitourinary:  Negative for decreased urine volume, difficulty urinating, dyspareunia, dysuria, enuresis, flank pain, frequency, genital sores, hematuria, menstrual problem, pelvic pain, urgency, vaginal bleeding, vaginal discharge and vaginal pain. Musculoskeletal:  Negative for arthralgias, back pain, gait problem, joint swelling, myalgias, neck pain and neck stiffness. Skin:  Negative for color change, pallor, rash and wound. Allergic/Immunologic: Negative for environmental allergies, food allergies and immunocompromised state.    Neurological:  Negative for dizziness, tremors, seizures, syncope, facial

## 2023-11-02 ASSESSMENT — ENCOUNTER SYMPTOMS
NAUSEA: 0
STRIDOR: 0
COLOR CHANGE: 0
RHINORRHEA: 0
VOMITING: 0
ANAL BLEEDING: 0
RECTAL PAIN: 0
APNEA: 0
BLOOD IN STOOL: 0
EYE ITCHING: 0
TROUBLE SWALLOWING: 0
EYE PAIN: 0
BACK PAIN: 0
VOICE CHANGE: 0
SINUS PRESSURE: 0
COUGH: 0
DIARRHEA: 0
WHEEZING: 0
ABDOMINAL PAIN: 0
CONSTIPATION: 0
CHOKING: 0
SHORTNESS OF BREATH: 0
CHEST TIGHTNESS: 0
EYE REDNESS: 0
EYE DISCHARGE: 0
FACIAL SWELLING: 0
ABDOMINAL DISTENTION: 0
SORE THROAT: 0
PHOTOPHOBIA: 0

## 2023-12-04 DIAGNOSIS — R73.01 IFG (IMPAIRED FASTING GLUCOSE): ICD-10-CM

## 2023-12-04 DIAGNOSIS — E88.819 INSULIN RESISTANCE: ICD-10-CM

## 2023-12-04 DIAGNOSIS — R53.83 FATIGUE, UNSPECIFIED TYPE: ICD-10-CM

## 2023-12-04 DIAGNOSIS — E78.2 MIXED HYPERLIPIDEMIA: ICD-10-CM

## 2023-12-04 DIAGNOSIS — E53.8 VITAMIN B 12 DEFICIENCY: ICD-10-CM

## 2023-12-04 LAB
ABSOLUTE IMMATURE GRANULOCYTE: <0.03 K/UL (ref 0–0.58)
ALBUMIN SERPL-MCNC: 4.4 G/DL (ref 3.5–5.2)
ALP BLD-CCNC: 100 U/L (ref 35–104)
ALT SERPL-CCNC: 12 U/L (ref 0–32)
ANION GAP SERPL CALCULATED.3IONS-SCNC: 17 MMOL/L (ref 7–16)
AST SERPL-CCNC: 19 U/L (ref 0–31)
BASOPHILS ABSOLUTE: 0.02 K/UL (ref 0–0.2)
BASOPHILS RELATIVE PERCENT: 0 % (ref 0–2)
BILIRUB SERPL-MCNC: 0.2 MG/DL (ref 0–1.2)
BUN BLDV-MCNC: 11 MG/DL (ref 6–20)
CALCIUM SERPL-MCNC: 9.2 MG/DL (ref 8.6–10.2)
CHLORIDE BLD-SCNC: 99 MMOL/L (ref 98–107)
CHOLESTEROL: 164 MG/DL
CO2: 20 MMOL/L (ref 22–29)
CREAT SERPL-MCNC: 0.8 MG/DL (ref 0.5–1)
EOSINOPHILS ABSOLUTE: 0.09 K/UL (ref 0.05–0.5)
EOSINOPHILS RELATIVE PERCENT: 2 % (ref 0–6)
FOLATE: 10.9 NG/ML (ref 4.8–24.2)
GFR SERPL CREATININE-BSD FRML MDRD: >60 ML/MIN/1.73M2
GLUCOSE BLD-MCNC: 93 MG/DL (ref 74–99)
HBA1C MFR BLD: 5.1 % (ref 4–5.6)
HCT VFR BLD CALC: 44.1 % (ref 34–48)
HDLC SERPL-MCNC: 39 MG/DL
HEMOGLOBIN: 14.3 G/DL (ref 11.5–15.5)
IMMATURE GRANULOCYTES: 0 % (ref 0–5)
LDL CHOLESTEROL: 112 MG/DL
LYMPHOCYTES ABSOLUTE: 1.31 K/UL (ref 1.5–4)
LYMPHOCYTES RELATIVE PERCENT: 27 % (ref 20–42)
MCH RBC QN AUTO: 29.3 PG (ref 26–35)
MCHC RBC AUTO-ENTMCNC: 32.4 G/DL (ref 32–34.5)
MCV RBC AUTO: 90.4 FL (ref 80–99.9)
MONOCYTES ABSOLUTE: 0.38 K/UL (ref 0.1–0.95)
MONOCYTES RELATIVE PERCENT: 8 % (ref 2–12)
NEUTROPHILS ABSOLUTE: 3.01 K/UL (ref 1.8–7.3)
NEUTROPHILS RELATIVE PERCENT: 62 % (ref 43–80)
PDW BLD-RTO: 12.5 % (ref 11.5–15)
PLATELET # BLD: 224 K/UL (ref 130–450)
PMV BLD AUTO: 11.1 FL (ref 7–12)
POTASSIUM SERPL-SCNC: 4.5 MMOL/L (ref 3.5–5)
RBC # BLD: 4.88 M/UL (ref 3.5–5.5)
SODIUM BLD-SCNC: 136 MMOL/L (ref 132–146)
T3 FREE: 3.28 PG/ML (ref 2–4.4)
T4 FREE: 1 NG/DL (ref 0.9–1.7)
TOTAL PROTEIN: 7.5 G/DL (ref 6.4–8.3)
TRIGL SERPL-MCNC: 66 MG/DL
TSH SERPL DL<=0.05 MIU/L-ACNC: 1.6 UIU/ML (ref 0.27–4.2)
VITAMIN B-12: 446 PG/ML (ref 211–946)
VLDLC SERPL CALC-MCNC: 13 MG/DL
WBC # BLD: 4.8 K/UL (ref 4.5–11.5)

## 2023-12-06 ENCOUNTER — OFFICE VISIT (OUTPATIENT)
Dept: FAMILY MEDICINE CLINIC | Age: 28
End: 2023-12-06
Payer: COMMERCIAL

## 2023-12-06 VITALS
SYSTOLIC BLOOD PRESSURE: 124 MMHG | DIASTOLIC BLOOD PRESSURE: 80 MMHG | WEIGHT: 228 LBS | RESPIRATION RATE: 18 BRPM | BODY MASS INDEX: 37.99 KG/M2 | HEIGHT: 65 IN | HEART RATE: 83 BPM | OXYGEN SATURATION: 98 %

## 2023-12-06 DIAGNOSIS — Z11.1 VISIT FOR TB SKIN TEST: Primary | ICD-10-CM

## 2023-12-06 DIAGNOSIS — E66.01 MORBIDLY OBESE (HCC): ICD-10-CM

## 2023-12-06 LAB
C-PEPTIDE: 4.1 NG/ML (ref 1.1–4.4)
DHEAS (DHEA SULFATE): 174 UG/DL (ref 98.8–340)

## 2023-12-06 PROCEDURE — 1036F TOBACCO NON-USER: CPT | Performed by: FAMILY MEDICINE

## 2023-12-06 PROCEDURE — G8484 FLU IMMUNIZE NO ADMIN: HCPCS | Performed by: FAMILY MEDICINE

## 2023-12-06 PROCEDURE — G8417 CALC BMI ABV UP PARAM F/U: HCPCS | Performed by: FAMILY MEDICINE

## 2023-12-06 PROCEDURE — G8427 DOCREV CUR MEDS BY ELIG CLIN: HCPCS | Performed by: FAMILY MEDICINE

## 2023-12-06 PROCEDURE — 99213 OFFICE O/P EST LOW 20 MIN: CPT | Performed by: FAMILY MEDICINE

## 2023-12-06 RX ORDER — PHENTERMINE HYDROCHLORIDE 37.5 MG/1
37.5 TABLET ORAL
Qty: 30 TABLET | Refills: 0 | Status: SHIPPED | OUTPATIENT
Start: 2023-12-06 | End: 2024-01-05

## 2023-12-11 ASSESSMENT — ENCOUNTER SYMPTOMS
SHORTNESS OF BREATH: 0
CHEST TIGHTNESS: 0
BLOOD IN STOOL: 0
CONSTIPATION: 0
DIARRHEA: 0
EYE ITCHING: 0
PHOTOPHOBIA: 0
STRIDOR: 0
FACIAL SWELLING: 0
ANAL BLEEDING: 0
EYE REDNESS: 0
WHEEZING: 0
RECTAL PAIN: 0
CHOKING: 0
RHINORRHEA: 0
EYE DISCHARGE: 0
NAUSEA: 0
ABDOMINAL DISTENTION: 0
COLOR CHANGE: 0
ABDOMINAL PAIN: 0
TROUBLE SWALLOWING: 0
BACK PAIN: 0
APNEA: 0
VOICE CHANGE: 0
COUGH: 0
SINUS PRESSURE: 0
EYE PAIN: 0
VOMITING: 0
SORE THROAT: 0

## 2023-12-11 NOTE — PROGRESS NOTES
Mak Lin is a 33 y.o. female  . Subjective:      Doing well on Adipex. Watching diet and exercising. Has lost weight. We discussed staying on it for at least 3 more months. If she plateaus and losing weight then we can always stop. Will repeat blood work at the end. Patient is sure she is not pregnant . If at any time she suspects she might be, she is to stop all medications immediately and call OB/GYNE for directions. Review of Systems   Constitutional:  Negative for activity change, appetite change, chills, diaphoresis, fatigue, fever and unexpected weight change. HENT:  Negative for congestion, dental problem, drooling, ear discharge, ear pain, facial swelling, hearing loss, mouth sores, nosebleeds, postnasal drip, rhinorrhea, sinus pressure, sneezing, sore throat, tinnitus, trouble swallowing and voice change. Eyes:  Negative for photophobia, pain, discharge, redness, itching and visual disturbance. Respiratory:  Negative for apnea, cough, choking, chest tightness, shortness of breath, wheezing and stridor. Cardiovascular:  Negative for chest pain, palpitations and leg swelling. Gastrointestinal:  Negative for abdominal distention, abdominal pain, anal bleeding, blood in stool, constipation, diarrhea, nausea, rectal pain and vomiting. Endocrine: Negative for cold intolerance, heat intolerance, polydipsia, polyphagia and polyuria. Genitourinary:  Negative for decreased urine volume, difficulty urinating, dyspareunia, dysuria, enuresis, flank pain, frequency, genital sores, hematuria, menstrual problem, pelvic pain, urgency, vaginal bleeding, vaginal discharge and vaginal pain. Musculoskeletal:  Negative for arthralgias, back pain, gait problem, joint swelling, myalgias, neck pain and neck stiffness. Skin:  Negative for color change, pallor, rash and wound. Allergic/Immunologic: Negative for environmental allergies, food allergies and immunocompromised state.

## 2023-12-15 RX ORDER — AMOXICILLIN 500 MG/1
500 CAPSULE ORAL 2 TIMES DAILY
Qty: 20 CAPSULE | Refills: 0 | Status: SHIPPED | OUTPATIENT
Start: 2023-12-15 | End: 2023-12-25

## 2024-01-08 ENCOUNTER — HOSPITAL ENCOUNTER (OUTPATIENT)
Age: 29
Discharge: HOME | End: 2024-01-08
Payer: COMMERCIAL

## 2024-01-08 DIAGNOSIS — Z11.1 VISIT FOR TB SKIN TEST: ICD-10-CM

## 2024-01-08 PROCEDURE — 36415 COLL VENOUS BLD VENIPUNCTURE: CPT

## 2024-01-08 PROCEDURE — 86481 TB AG RESPONSE T-CELL SUSP: CPT

## 2024-01-10 ENCOUNTER — OFFICE VISIT (OUTPATIENT)
Dept: FAMILY MEDICINE CLINIC | Age: 29
End: 2024-01-10
Payer: COMMERCIAL

## 2024-01-10 VITALS
HEIGHT: 65 IN | RESPIRATION RATE: 18 BRPM | BODY MASS INDEX: 37.99 KG/M2 | WEIGHT: 228 LBS | HEART RATE: 77 BPM | OXYGEN SATURATION: 99 % | DIASTOLIC BLOOD PRESSURE: 80 MMHG | SYSTOLIC BLOOD PRESSURE: 122 MMHG

## 2024-01-10 DIAGNOSIS — E66.01 MORBIDLY OBESE (HCC): ICD-10-CM

## 2024-01-10 PROCEDURE — G8417 CALC BMI ABV UP PARAM F/U: HCPCS | Performed by: FAMILY MEDICINE

## 2024-01-10 PROCEDURE — 99213 OFFICE O/P EST LOW 20 MIN: CPT | Performed by: FAMILY MEDICINE

## 2024-01-10 PROCEDURE — 1036F TOBACCO NON-USER: CPT | Performed by: FAMILY MEDICINE

## 2024-01-10 PROCEDURE — G8427 DOCREV CUR MEDS BY ELIG CLIN: HCPCS | Performed by: FAMILY MEDICINE

## 2024-01-10 PROCEDURE — G8484 FLU IMMUNIZE NO ADMIN: HCPCS | Performed by: FAMILY MEDICINE

## 2024-01-10 RX ORDER — PHENTERMINE HYDROCHLORIDE 37.5 MG/1
37.5 TABLET ORAL
Qty: 30 TABLET | Refills: 0 | Status: SHIPPED | OUTPATIENT
Start: 2024-01-10 | End: 2024-02-09

## 2024-01-10 ASSESSMENT — PATIENT HEALTH QUESTIONNAIRE - PHQ9
1. LITTLE INTEREST OR PLEASURE IN DOING THINGS: 0
SUM OF ALL RESPONSES TO PHQ QUESTIONS 1-9: 0
SUM OF ALL RESPONSES TO PHQ9 QUESTIONS 1 & 2: 0
SUM OF ALL RESPONSES TO PHQ QUESTIONS 1-9: 0
SUM OF ALL RESPONSES TO PHQ QUESTIONS 1-9: 0
2. FEELING DOWN, DEPRESSED OR HOPELESS: 0
SUM OF ALL RESPONSES TO PHQ QUESTIONS 1-9: 0

## 2024-01-12 ASSESSMENT — ENCOUNTER SYMPTOMS
ABDOMINAL PAIN: 0
RECTAL PAIN: 0
DIARRHEA: 0
EYE REDNESS: 0
APNEA: 0
VOMITING: 0
TROUBLE SWALLOWING: 0
NAUSEA: 0
FACIAL SWELLING: 0
EYE DISCHARGE: 0
PHOTOPHOBIA: 0
VOICE CHANGE: 0
STRIDOR: 0
BACK PAIN: 0
SORE THROAT: 0
ANAL BLEEDING: 0
COUGH: 0
SINUS PRESSURE: 0
EYE PAIN: 0
BLOOD IN STOOL: 0
CHEST TIGHTNESS: 0
CHOKING: 0
ABDOMINAL DISTENTION: 0
CONSTIPATION: 0
COLOR CHANGE: 0
EYE ITCHING: 0
SHORTNESS OF BREATH: 0
WHEEZING: 0
RHINORRHEA: 0

## 2024-01-12 NOTE — PROGRESS NOTES
Lindy Encinas is a 28 y.o. female  .  Subjective:      Doing well on phentermine.  No side effects.  Lost some weight even on the holiday season.  Increased exercise.  Will repeat blood work probably another 6 months.  Discussed recent blood work.  Follow-up with gynecologist.  Patient is sure she is not pregnant .If at any time she suspects she might be, she is to stop all medications immediately and call OB/GYNE for directions.  Discussed vaccinations recommendations        Review of Systems   Constitutional:  Positive for unexpected weight change. Negative for activity change, appetite change, chills, diaphoresis, fatigue and fever.   HENT:  Negative for congestion, dental problem, drooling, ear discharge, ear pain, facial swelling, hearing loss, mouth sores, nosebleeds, postnasal drip, rhinorrhea, sinus pressure, sneezing, sore throat, tinnitus, trouble swallowing and voice change.    Eyes:  Negative for photophobia, pain, discharge, redness, itching and visual disturbance.   Respiratory:  Negative for apnea, cough, choking, chest tightness, shortness of breath, wheezing and stridor.    Cardiovascular:  Negative for chest pain, palpitations and leg swelling.   Gastrointestinal:  Negative for abdominal distention, abdominal pain, anal bleeding, blood in stool, constipation, diarrhea, nausea, rectal pain and vomiting.   Endocrine: Negative for cold intolerance, heat intolerance, polydipsia, polyphagia and polyuria.   Genitourinary:  Negative for decreased urine volume, difficulty urinating, dyspareunia, dysuria, enuresis, flank pain, frequency, genital sores, hematuria, menstrual problem, pelvic pain, urgency, vaginal bleeding, vaginal discharge and vaginal pain.   Musculoskeletal:  Negative for arthralgias, back pain, gait problem, joint swelling, myalgias, neck pain and neck stiffness.   Skin:  Negative for color change, pallor, rash and wound.   Allergic/Immunologic: Negative for environmental allergies,

## 2024-01-18 LAB — T SPOT TB TEST: NORMAL

## 2024-02-08 ENCOUNTER — OFFICE VISIT (OUTPATIENT)
Dept: FAMILY MEDICINE CLINIC | Age: 29
End: 2024-02-08
Payer: COMMERCIAL

## 2024-02-08 VITALS
TEMPERATURE: 97.8 F | OXYGEN SATURATION: 99 % | WEIGHT: 225 LBS | HEIGHT: 65 IN | BODY MASS INDEX: 37.49 KG/M2 | HEART RATE: 84 BPM | DIASTOLIC BLOOD PRESSURE: 68 MMHG | RESPIRATION RATE: 18 BRPM | SYSTOLIC BLOOD PRESSURE: 116 MMHG

## 2024-02-08 DIAGNOSIS — E66.01 MORBIDLY OBESE (HCC): Primary | ICD-10-CM

## 2024-02-08 PROCEDURE — G8427 DOCREV CUR MEDS BY ELIG CLIN: HCPCS | Performed by: FAMILY MEDICINE

## 2024-02-08 PROCEDURE — G8482 FLU IMMUNIZE ORDER/ADMIN: HCPCS | Performed by: FAMILY MEDICINE

## 2024-02-08 PROCEDURE — G8417 CALC BMI ABV UP PARAM F/U: HCPCS | Performed by: FAMILY MEDICINE

## 2024-02-08 PROCEDURE — 99213 OFFICE O/P EST LOW 20 MIN: CPT | Performed by: FAMILY MEDICINE

## 2024-02-08 PROCEDURE — 1036F TOBACCO NON-USER: CPT | Performed by: FAMILY MEDICINE

## 2024-02-08 RX ORDER — PHENTERMINE HYDROCHLORIDE 37.5 MG/1
37.5 TABLET ORAL
Qty: 30 TABLET | Refills: 0 | Status: SHIPPED | OUTPATIENT
Start: 2024-02-08 | End: 2024-03-09

## 2024-02-08 NOTE — PROGRESS NOTES
Cervical back: Normal range of motion and neck supple.   Lymphadenopathy:      Cervical: No cervical adenopathy.   Skin:     General: Skin is warm and dry.      Coloration: Skin is not pale.      Findings: No erythema or rash.   Neurological:      Mental Status: She is alert and oriented to person, place, and time.      Cranial Nerves: No cranial nerve deficit.      Motor: No abnormal muscle tone.      Coordination: Coordination normal.      Deep Tendon Reflexes: Reflexes are normal and symmetric. Reflexes normal.   Psychiatric:         Behavior: Behavior normal.         Thought Content: Thought content normal.         Judgment: Judgment normal.         Assessment / Plan:   Lindy was seen today for weight management.    Diagnoses and all orders for this visit:    Morbidly obese (HCC)  -     phentermine (ADIPEX-P) 37.5 MG tablet; Take 1 tablet by mouth every morning (before breakfast) for 30 days. Max Daily Amount: 37.5 mg        Willfollow with own gynecologist for gynecological and breast care.    Reviewed health maintenance report. Patient is aware of deficiencies and suggested preventative tests.

## 2024-02-09 ASSESSMENT — ENCOUNTER SYMPTOMS
RHINORRHEA: 0
TROUBLE SWALLOWING: 0
CHEST TIGHTNESS: 0
CHOKING: 0
RECTAL PAIN: 0
EYE REDNESS: 0
CONSTIPATION: 0
FACIAL SWELLING: 0
SINUS PRESSURE: 0
ANAL BLEEDING: 0
STRIDOR: 0
EYE ITCHING: 0
VOICE CHANGE: 0
NAUSEA: 0
VOMITING: 0
EYE PAIN: 0
COLOR CHANGE: 0
ABDOMINAL PAIN: 0
SHORTNESS OF BREATH: 0
BACK PAIN: 0
PHOTOPHOBIA: 0
EYE DISCHARGE: 0
DIARRHEA: 0
ABDOMINAL DISTENTION: 0
WHEEZING: 0
APNEA: 0
COUGH: 0
SORE THROAT: 0
BLOOD IN STOOL: 0

## 2024-03-11 ENCOUNTER — TELEMEDICINE (OUTPATIENT)
Dept: FAMILY MEDICINE CLINIC | Age: 29
End: 2024-03-11
Payer: COMMERCIAL

## 2024-03-11 DIAGNOSIS — E66.01 MORBIDLY OBESE (HCC): Primary | ICD-10-CM

## 2024-03-11 PROCEDURE — 99213 OFFICE O/P EST LOW 20 MIN: CPT | Performed by: FAMILY MEDICINE

## 2024-03-11 PROCEDURE — G8482 FLU IMMUNIZE ORDER/ADMIN: HCPCS | Performed by: FAMILY MEDICINE

## 2024-03-11 PROCEDURE — G8417 CALC BMI ABV UP PARAM F/U: HCPCS | Performed by: FAMILY MEDICINE

## 2024-03-11 PROCEDURE — G8428 CUR MEDS NOT DOCUMENT: HCPCS | Performed by: FAMILY MEDICINE

## 2024-03-11 PROCEDURE — 1036F TOBACCO NON-USER: CPT | Performed by: FAMILY MEDICINE

## 2024-03-11 RX ORDER — PHENTERMINE HYDROCHLORIDE 37.5 MG/1
37.5 TABLET ORAL
Qty: 30 TABLET | Refills: 0 | Status: SHIPPED | OUTPATIENT
Start: 2024-03-11 | End: 2024-04-10

## 2024-03-11 NOTE — PROGRESS NOTES
Lindy Encinas is a 28 y.o. female  .  Subjective:      Doing well weight 219.8. no side effects. Watching diet and exercise. No new complaints        Review of Systems   Constitutional:  Negative for activity change, appetite change, chills, diaphoresis, fatigue, fever and unexpected weight change.   HENT:  Negative for congestion, dental problem, drooling, ear discharge, ear pain, facial swelling, hearing loss, mouth sores, nosebleeds, postnasal drip, rhinorrhea, sinus pressure, sneezing, sore throat, tinnitus, trouble swallowing and voice change.    Eyes:  Negative for photophobia, pain, discharge, redness, itching and visual disturbance.   Respiratory:  Negative for apnea, cough, choking, chest tightness, shortness of breath, wheezing and stridor.    Cardiovascular:  Negative for chest pain, palpitations and leg swelling.   Gastrointestinal:  Negative for abdominal distention, abdominal pain, anal bleeding, blood in stool, constipation, diarrhea, nausea, rectal pain and vomiting.   Endocrine: Negative for cold intolerance, heat intolerance, polydipsia, polyphagia and polyuria.   Genitourinary:  Negative for decreased urine volume, difficulty urinating, dyspareunia, dysuria, enuresis, flank pain, frequency, genital sores, hematuria, menstrual problem, pelvic pain, urgency, vaginal bleeding, vaginal discharge and vaginal pain.   Musculoskeletal:  Negative for arthralgias, back pain, gait problem, joint swelling, myalgias, neck pain and neck stiffness.   Skin:  Negative for color change, pallor, rash and wound.   Allergic/Immunologic: Negative for environmental allergies, food allergies and immunocompromised state.   Neurological:  Negative for dizziness, tremors, seizures, syncope, facial asymmetry, speech difficulty, weakness, light-headedness, numbness and headaches.   Hematological:  Negative for adenopathy. Does not bruise/bleed easily.   Psychiatric/Behavioral:  Negative for agitation, behavioral

## 2024-03-15 ASSESSMENT — ENCOUNTER SYMPTOMS
SORE THROAT: 0
COLOR CHANGE: 0
ABDOMINAL DISTENTION: 0
EYE REDNESS: 0
SHORTNESS OF BREATH: 0
WHEEZING: 0
DIARRHEA: 0
ANAL BLEEDING: 0
RECTAL PAIN: 0
FACIAL SWELLING: 0
ABDOMINAL PAIN: 0
PHOTOPHOBIA: 0
CHOKING: 0
VOICE CHANGE: 0
STRIDOR: 0
EYE PAIN: 0
BLOOD IN STOOL: 0
COUGH: 0
CONSTIPATION: 0
VOMITING: 0
APNEA: 0
NAUSEA: 0
EYE DISCHARGE: 0
SINUS PRESSURE: 0
EYE ITCHING: 0
RHINORRHEA: 0
BACK PAIN: 0
TROUBLE SWALLOWING: 0
CHEST TIGHTNESS: 0

## 2024-05-08 ENCOUNTER — TELEMEDICINE (OUTPATIENT)
Dept: FAMILY MEDICINE CLINIC | Age: 29
End: 2024-05-08
Payer: COMMERCIAL

## 2024-05-08 DIAGNOSIS — E66.01 MORBIDLY OBESE (HCC): ICD-10-CM

## 2024-05-08 PROCEDURE — 1036F TOBACCO NON-USER: CPT | Performed by: FAMILY MEDICINE

## 2024-05-08 PROCEDURE — 99213 OFFICE O/P EST LOW 20 MIN: CPT | Performed by: FAMILY MEDICINE

## 2024-05-08 PROCEDURE — G8427 DOCREV CUR MEDS BY ELIG CLIN: HCPCS | Performed by: FAMILY MEDICINE

## 2024-05-08 PROCEDURE — G8417 CALC BMI ABV UP PARAM F/U: HCPCS | Performed by: FAMILY MEDICINE

## 2024-05-08 RX ORDER — PHENTERMINE HYDROCHLORIDE 37.5 MG/1
37.5 TABLET ORAL
Qty: 30 TABLET | Refills: 0 | Status: SHIPPED | OUTPATIENT
Start: 2024-05-08 | End: 2024-06-07

## 2024-05-09 ASSESSMENT — ENCOUNTER SYMPTOMS
COLOR CHANGE: 0
RHINORRHEA: 0
STRIDOR: 0
EYE PAIN: 0
SHORTNESS OF BREATH: 0
EYE DISCHARGE: 0
COUGH: 0
CHEST TIGHTNESS: 0
PHOTOPHOBIA: 0
ABDOMINAL PAIN: 0
SINUS PRESSURE: 0
BLOOD IN STOOL: 0
ANAL BLEEDING: 0
FACIAL SWELLING: 0
EYE ITCHING: 0
BACK PAIN: 0
TROUBLE SWALLOWING: 0
VOICE CHANGE: 0
EYE REDNESS: 0
CHOKING: 0
ABDOMINAL DISTENTION: 0
VOMITING: 0
DIARRHEA: 0
NAUSEA: 0
WHEEZING: 0
SORE THROAT: 0
RECTAL PAIN: 0
APNEA: 0
CONSTIPATION: 0

## 2024-05-09 NOTE — PROGRESS NOTES
Negative for environmental allergies, food allergies and immunocompromised state.   Neurological:  Negative for dizziness, tremors, seizures, syncope, facial asymmetry, speech difficulty, weakness, light-headedness, numbness and headaches.   Hematological:  Negative for adenopathy. Does not bruise/bleed easily.   Psychiatric/Behavioral:  Negative for agitation, behavioral problems, confusion, decreased concentration, dysphoric mood, hallucinations, self-injury, sleep disturbance and suicidal ideas. The patient is not nervous/anxious and is not hyperactive.        Past Medical History:   Diagnosis Date    Complication of anesthesia     hx of waking during anesthesia       Social History     Socioeconomic History    Marital status: Single     Spouse name: Not on file    Number of children: Not on file    Years of education: Not on file    Highest education level: Not on file   Occupational History    Not on file   Tobacco Use    Smoking status: Former     Current packs/day: 0.50     Types: Cigarettes    Smokeless tobacco: Never    Tobacco comments:     quit 6 years ago   Vaping Use    Vaping Use: Never used   Substance and Sexual Activity    Alcohol use: Yes     Comment: Socially    Drug use: No    Sexual activity: Yes     Partners: Male   Other Topics Concern    Not on file   Social History Narrative    Not on file     Social Determinants of Health     Financial Resource Strain: Patient Declined (10/30/2023)    Overall Financial Resource Strain (CARDIA)     Difficulty of Paying Living Expenses: Patient declined   Food Insecurity: Not on file (10/30/2023)   Transportation Needs: Unknown (10/30/2023)    PRAPARE - Transportation     Lack of Transportation (Medical): Not on file     Lack of Transportation (Non-Medical): Patient declined   Physical Activity: Not on file   Stress: Not on file   Social Connections: Not on file   Intimate Partner Violence: Not on file   Housing Stability: Unknown (10/30/2023)    Housing

## 2024-07-23 ENCOUNTER — OFFICE VISIT (OUTPATIENT)
Dept: FAMILY MEDICINE CLINIC | Age: 29
End: 2024-07-23
Payer: COMMERCIAL

## 2024-07-23 VITALS
OXYGEN SATURATION: 98 % | HEART RATE: 115 BPM | SYSTOLIC BLOOD PRESSURE: 120 MMHG | HEIGHT: 65 IN | BODY MASS INDEX: 35.99 KG/M2 | WEIGHT: 216 LBS | RESPIRATION RATE: 18 BRPM | DIASTOLIC BLOOD PRESSURE: 78 MMHG

## 2024-07-23 DIAGNOSIS — G43.709 CHRONIC MIGRAINE WITHOUT AURA WITHOUT STATUS MIGRAINOSUS, NOT INTRACTABLE: Primary | ICD-10-CM

## 2024-07-23 DIAGNOSIS — E66.01 MORBIDLY OBESE (HCC): ICD-10-CM

## 2024-07-23 PROCEDURE — 99213 OFFICE O/P EST LOW 20 MIN: CPT | Performed by: FAMILY MEDICINE

## 2024-07-23 PROCEDURE — 1036F TOBACCO NON-USER: CPT | Performed by: FAMILY MEDICINE

## 2024-07-23 PROCEDURE — G8427 DOCREV CUR MEDS BY ELIG CLIN: HCPCS | Performed by: FAMILY MEDICINE

## 2024-07-23 PROCEDURE — G8417 CALC BMI ABV UP PARAM F/U: HCPCS | Performed by: FAMILY MEDICINE

## 2024-07-23 RX ORDER — PHENTERMINE HYDROCHLORIDE 37.5 MG/1
37.5 TABLET ORAL
Qty: 30 TABLET | Refills: 0 | Status: SHIPPED | OUTPATIENT
Start: 2024-07-23 | End: 2024-08-22

## 2024-07-23 RX ORDER — TOPIRAMATE 50 MG/1
100 TABLET, FILM COATED ORAL DAILY
Qty: 60 TABLET | Refills: 4 | Status: SHIPPED | OUTPATIENT
Start: 2024-07-23

## 2024-07-26 ASSESSMENT — ENCOUNTER SYMPTOMS
BACK PAIN: 0
ANAL BLEEDING: 0
STRIDOR: 0
NAUSEA: 0
CHOKING: 0
SHORTNESS OF BREATH: 0
CONSTIPATION: 0
DIARRHEA: 0
VOICE CHANGE: 0
RHINORRHEA: 0
EYE REDNESS: 0
ABDOMINAL PAIN: 0
PHOTOPHOBIA: 0
EYE DISCHARGE: 0
SINUS PRESSURE: 0
EYE ITCHING: 0
EYE PAIN: 0
RECTAL PAIN: 0
BLOOD IN STOOL: 0
COLOR CHANGE: 0
FACIAL SWELLING: 0
SORE THROAT: 0
ABDOMINAL DISTENTION: 0
TROUBLE SWALLOWING: 0
CHEST TIGHTNESS: 0
VOMITING: 0
APNEA: 0
COUGH: 0
WHEEZING: 0

## 2024-07-26 NOTE — PROGRESS NOTES
Lindy Encinas is a 29 y.o. female  .  Subjective:      Doing well on phentermine.  Discussed diet and exercise.  Discussed adding topiramate patient does have migraines may indeed help.  Is having migraines once once a month.  She reported any side effects.  Will follow-up again in 1 month for weight.  Patient is sure she is not pregnant .If at any time she suspects she might be, she is to stop all medications immediately and call OB/GYNE for directions.  .Will follow with own gynecologist for gynecological and breast care.           Review of Systems   Constitutional:  Positive for fatigue. Negative for activity change, appetite change, chills, diaphoresis, fever and unexpected weight change.   HENT:  Negative for congestion, dental problem, drooling, ear discharge, ear pain, facial swelling, hearing loss, mouth sores, nosebleeds, postnasal drip, rhinorrhea, sinus pressure, sneezing, sore throat, tinnitus, trouble swallowing and voice change.    Eyes:  Negative for photophobia, pain, discharge, redness, itching and visual disturbance.   Respiratory:  Negative for apnea, cough, choking, chest tightness, shortness of breath, wheezing and stridor.    Cardiovascular:  Negative for chest pain, palpitations and leg swelling.   Gastrointestinal:  Negative for abdominal distention, abdominal pain, anal bleeding, blood in stool, constipation, diarrhea, nausea, rectal pain and vomiting.   Endocrine: Negative for cold intolerance, heat intolerance, polydipsia, polyphagia and polyuria.   Genitourinary:  Negative for decreased urine volume, difficulty urinating, dyspareunia, dysuria, enuresis, flank pain, frequency, genital sores, hematuria, menstrual problem, pelvic pain, urgency, vaginal bleeding, vaginal discharge and vaginal pain.   Musculoskeletal:  Negative for arthralgias, back pain, gait problem, joint swelling, myalgias, neck pain and neck stiffness.   Skin:  Negative for color change, pallor, rash and wound.

## 2024-08-15 LAB — SURGICAL PATHOLOGY REPORT: NORMAL

## 2025-05-02 LAB
CLINICAL INFORMATION: NORMAL
COMMENT: NORMAL
COMMENT: NORMAL
DATE OF PREVIOUS BIOPSY: NORMAL
DATE OF PREVIOUS PAP: NORMAL
HB: COMMENT: NORMAL
HB: CYTOTECHNOLT: NORMAL
HB: INTERPT: NORMAL
HB: SOURCE: NORMAL
HPV MRNA E6/E7: NOT DETECTED
LAST MENSTRUAL PERIOD: NORMAL
SPECIMEN ADEQUACY:: NORMAL